# Patient Record
Sex: FEMALE | Race: WHITE
[De-identification: names, ages, dates, MRNs, and addresses within clinical notes are randomized per-mention and may not be internally consistent; named-entity substitution may affect disease eponyms.]

---

## 2018-01-12 ENCOUNTER — HOSPITAL ENCOUNTER (EMERGENCY)
Dept: HOSPITAL 74 - JER | Age: 63
Discharge: HOME | End: 2018-01-12
Payer: SELF-PAY

## 2018-01-12 VITALS — HEART RATE: 66 BPM | SYSTOLIC BLOOD PRESSURE: 149 MMHG | DIASTOLIC BLOOD PRESSURE: 67 MMHG

## 2018-01-12 VITALS — TEMPERATURE: 97.6 F

## 2018-01-12 VITALS — BODY MASS INDEX: 23.8 KG/M2

## 2018-01-12 DIAGNOSIS — K52.9: Primary | ICD-10-CM

## 2018-01-12 LAB
ALBUMIN SERPL-MCNC: 3.6 G/DL (ref 3.4–5)
ALP SERPL-CCNC: 67 U/L (ref 45–117)
ALT SERPL-CCNC: 21 U/L (ref 12–78)
ANION GAP SERPL CALC-SCNC: 6 MMOL/L (ref 8–16)
APPEARANCE UR: CLEAR
AST SERPL-CCNC: 34 U/L (ref 15–37)
BASOPHILS # BLD: 1.2 % (ref 0–2)
BILIRUB SERPL-MCNC: 0.4 MG/DL (ref 0.2–1)
BILIRUB UR STRIP.AUTO-MCNC: NEGATIVE MG/DL
BUN SERPL-MCNC: 11 MG/DL (ref 7–18)
CALCIUM SERPL-MCNC: 8.5 MG/DL (ref 8.5–10.1)
CHLORIDE SERPL-SCNC: 103 MMOL/L (ref 98–107)
CO2 SERPL-SCNC: 30 MMOL/L (ref 21–32)
COLOR UR: (no result)
CREAT SERPL-MCNC: 0.7 MG/DL (ref 0.55–1.02)
DEPRECATED RDW RBC AUTO: 13.5 % (ref 11.6–15.6)
EOSINOPHIL # BLD: 5.1 % (ref 0–4.5)
GLUCOSE SERPL-MCNC: 96 MG/DL (ref 74–106)
HCT VFR BLD CALC: 35.8 % (ref 32.4–45.2)
HGB BLD-MCNC: 12.3 GM/DL (ref 10.7–15.3)
KETONES UR QL STRIP: NEGATIVE
LEUKOCYTE ESTERASE UR QL STRIP.AUTO: (no result)
LYMPHOCYTES # BLD: 49.1 % (ref 8–40)
MCH RBC QN AUTO: 30.4 PG (ref 25.7–33.7)
MCHC RBC AUTO-ENTMCNC: 34.4 G/DL (ref 32–36)
MCV RBC: 88.5 FL (ref 80–96)
MONOCYTES # BLD AUTO: 5.7 % (ref 3.8–10.2)
NEUTROPHILS # BLD: 38.9 % (ref 42.8–82.8)
NITRITE UR QL STRIP: NEGATIVE
PH UR: 5 [PH] (ref 5–8)
PLATELET # BLD AUTO: 202 K/MM3 (ref 134–434)
PMV BLD: 7 FL (ref 7.5–11.1)
POTASSIUM SERPLBLD-SCNC: 3.5 MMOL/L (ref 3.5–5.1)
PROT SERPL-MCNC: 7.1 G/DL (ref 6.4–8.2)
PROT UR QL STRIP: NEGATIVE
PROT UR QL STRIP: NEGATIVE
RBC # BLD AUTO: 4.05 M/MM3 (ref 3.6–5.2)
RBC # UR STRIP: (no result) /UL
SODIUM SERPL-SCNC: 139 MMOL/L (ref 136–145)
SP GR UR: 1.01 (ref 1–1.03)
UROBILINOGEN UR STRIP-MCNC: NEGATIVE MG/DL (ref 0.2–1)
WBC # BLD AUTO: 4.2 K/MM3 (ref 4–10)

## 2018-01-12 PROCEDURE — 3E033NZ INTRODUCTION OF ANALGESICS, HYPNOTICS, SEDATIVES INTO PERIPHERAL VEIN, PERCUTANEOUS APPROACH: ICD-10-PCS | Performed by: EMERGENCY MEDICINE

## 2018-01-12 NOTE — PDOC
History of Present Illness





- General


History Source: Patient


Exam Limitations: No Limitations





- History of Present Illness


Initial Comments: 





01/12/18 22:18


Patient is a 62 year old female with a significant past medical history of 

hypothyroidism who presents to the ED with complaints of headache that began 

this afternoon.  Patient reports experiencing headache this afternoon suddenly 

while home showing no signs of subsiding.  She reports experiencing 

intermittent fever and chills that began today at 5 pm.  Patient reports not 

taken any medication for pain.  As per patient's family, patient experienced 4 

episodes of vomiting since this morning. 


Denies nausea, vomiting. Denies chest pain, SOB. Denies contact with sick 

individuals, out of state travels. Denies any other symptoms. 


Allergies: None


Social history: No smoking. No alcohol. No illicit drugs. 


Surgical history: None


PMD: None








<Ze Piña - Last Filed: 01/12/18 22:18>





<Margareth Anton - Last Filed: 01/12/18 22:38>





- General


Chief Complaint: Nausea/Vomiting


Stated Complaint: FEVER


Time Seen by Provider: 01/12/18 19:59





Past History





<Ze Piña - Last Filed: 01/12/18 22:18>





- Past Medical History


CVA: Yes (H/O LACUNAR INFARCT)





- Suicide/Smoking/Psychosocial Hx


Smoking Status: No


Smoking History: Never smoked


Number of Cigarettes Smoked Daily: 0


Hx Alcohol Use: No


Drug/Substance Use Hx: No





<Margareth Anton - Last Filed: 01/12/18 22:38>





- Past Medical History


Allergies/Adverse Reactions: 


 Allergies











Allergy/AdvReac Type Severity Reaction Status Date / Time


 


No Known Allergies Allergy   Verified 01/12/18 22:00











Home Medications: 


Ambulatory Orders





Levothyroxine [Synthroid -] 50 mcg PO DAILY@0700 #0 tablet 02/28/13 


Pantoprazole Sodium [Protonix -] 40 mg PO DAILY #0 tablet.ec 02/28/13 


Acetaminophen [Tylenol] 650 mg PO Q4H PRN 01/12/18 


Ibuprofen [Motrin -] 600 mg PO TID PRN 01/12/18 











**Review of Systems





- Review of Systems


Able to Perform ROS?: Yes


Comments:: 





01/12/18 22:18


ADULT ROS


GENERAL/CONSTITUTIONAL: +Headache. +Fever. +Chills. 


No weakness.


HEAD, EYES, EARS, NOSE AND THROAT: No change in vision. No ear pain or 

discharge. No sore throat.


CARDIOVASCULAR: No chest pain or shortness of breath.


RESPIRATORY: No cough, wheezing, or hemoptysis.


GASTROINTESTINAL: +Vomiting. 


No nausea, diarrhea or constipation.


GENITOURINARY: No dysuria, frequency, or change in urination.


MUSCULOSKELETAL: No joint or muscle swelling or pain. No neck or back pain.


SKIN: No rash


NEUROLOGIC: +Headache. 


No vertigo, loss of consciousness, or change in strength/sensation.


ENDOCRINE: No increased thirst. No abnormal weight change.


HEMATOLOGIC/LYMPHATIC: No anemia, easy bleeding, or history of blood clots.


ALLERGIC/IMMUNOLOGIC: No hives or skin allergy.





All Other Systems: Reviewed and Negative





<Ze Piña - Last Filed: 01/12/18 22:18>





*Physical Exam





- Vital Signs


 Last Vital Signs











Temp Pulse Resp BP Pulse Ox


 


 97.6 F   67   18   130/60   99 


 


 01/12/18 19:57  01/12/18 19:57  01/12/18 19:57  01/12/18 19:57  01/12/18 19:57














- Physical Exam


Comments: 





01/12/18 22:19


GENERAL: Awake, alert, and fully oriented, in no acute distress


HEAD: No signs of trauma


EYES: +injected eyes. 


PERRLA, EOMI, sclera anicteric,


ENT: Auricles normal inspection, hearing grossly normal, nares patent, 

oropharynx clear without exudates. Moist mucosa


NECK: Normal ROM, supple, no lymphadenopathy, JVD, or masses


LUNGS: +Wheezing at top of right side. 


Breath sounds equal, clear to auscultation bilaterally.  No wheezes, and no 

crackles


HEART: Regular rate and rhythm, normal S1 and S2, no murmurs, rubs or gallops


ABDOMEN: +Superpubic pain. +Right flank pain. 


Soft, nontender, normoactive bowel sounds.  No guarding, no rebound.  No masses


EXTREMITIES: Normal range of motion, no edema.  No clubbing or cyanosis. No 

cords, erythema, or tenderness


NEUROLOGICAL: Cranial nerves II through XII grossly intact.  Normal speech, 

normal gait


SKIN: Warm, Dry, normal turgor, no rashes or lesions noted.








<Ze Piña - Last Filed: 01/12/18 22:18>





- Vital Signs


 Last Vital Signs











Temp Pulse Resp BP Pulse Ox


 


 97.6 F   67   18   130/60   99 


 


 01/12/18 19:57  01/12/18 19:57  01/12/18 19:57  01/12/18 19:57  01/12/18 19:57














<Margareth Anton - Last Filed: 01/12/18 22:38>





ED Treatment Course





- LABORATORY


CBC & Chemistry Diagram: 


 01/12/18 20:44





 01/12/18 20:44





- ADDITIONAL ORDERS


Additional order review: 


 Laboratory  Results











  01/12/18





  20:44


 


Sodium  139


 


Potassium  3.5


 


Chloride  103


 


Carbon Dioxide  30


 


Anion Gap  6 L


 


BUN  11


 


Creatinine  0.7


 


Creat Clearance w eGFR  > 60


 


Random Glucose  96


 


Calcium  8.5


 


Total Bilirubin  0.4  D


 


AST  34  D


 


ALT  21  D


 


Alkaline Phosphatase  67


 


Total Protein  7.1


 


Albumin  3.6


 


TSH  1.43








 











  01/12/18





  20:44


 


RBC  4.05


 


MCV  88.5


 


MCHC  34.4


 


RDW  13.5


 


MPV  7.0 L


 


Neutrophils %  38.9 L D


 


Lymphocytes %  49.1 H D


 


Monocytes %  5.7


 


Eosinophils %  5.1 H D


 


Basophils %  1.2














- Medications


Given in the ED: 


ED Medications














Discontinued Medications














Generic Name Dose Route Start Last Admin





  Trade Name Adán  PRN Reason Stop Dose Admin


 


Acetaminophen  1,000 mg  01/12/18 20:38  01/12/18 21:23





  Tylenol -  PO  01/12/18 20:39  Not Given





  ONCE ONE   


 


Acetaminophen  1,000 mg  01/12/18 21:05  01/12/18 21:07





  Ofirmev Injection -  IVPB  01/12/18 21:06  1,000 mg





  ONCE ONE   Administration


 


Sodium Chloride  1,000 ml  01/12/18 20:38  01/12/18 21:03





  Normal Saline -  IV  01/12/18 20:39  1,000 ml





  ONCE ONE   Administration














<Ze Piña - Last Filed: 01/12/18 22:18>





- LABORATORY


CBC & Chemistry Diagram: 


 01/12/18 20:44





 01/12/18 20:44





- RADIOLOGY


Radiology Studies Ordered: 














 Category Date Time Status


 


 CHEST PA & LAT [RAD] Stat Radiology  01/12/18 20:39 Ordered














<Margareth Anton - Last Filed: 01/12/18 22:38>





Medical Decision Making





- Medical Decision Making





01/12/18 22:24


Pt comes with HA and nausea and vomiting x 4. No fever,  Pt still has HA; she 

has no neuro findings.  She has taken nothing for the pain at home.  Here she 

was hydrated and given tylenol and she is feeling better.


01/12/18 22:36


Ashvin is feeling better and she is ready for discharge.  Her headache has 

completely resolved with tylenol.  She states that her vomiting may have been 

due to something she ate in the home.  She states taht all family members eat 

their own food and that it is possible that she was food poisoned from a left 

over that she ate.  Pt has no fever and no chills and all her labs are normal. 


01/12/18 22:37


EKG same pattern as old





<Margareth Anton - Last Filed: 01/12/18 22:38>





*DC/Admit/Observation/Transfer





- Attestations


Scribe Attestion: 





01/12/18 22:19





Documentation prepared by Ze Piña, acting as medical scribe for Margareth Anton MD/DO.





<Ze Piña - Last Filed: 01/12/18 22:18>





- Discharge Dispostion


Admit: No





<Margareth Anton - Last Filed: 01/12/18 22:38>


Diagnosis at time of Disposition: 


 Nausea and vomiting, Headache, Gastroenteritis








- Discharge Dispostion


Disposition: HOME


Condition at time of disposition: Stable





- Patient Instructions


Printed Discharge Instructions:  DI for Vomiting -- Adult, DI for Headache

## 2018-01-12 NOTE — PDOC
Rapid Medical Evaluation


Chief Complaint: Nausea/Vomiting


Time Seen by Provider: 01/12/18 19:59


Medical Evaluation: 


 Allergies











Allergy/AdvReac Type Severity Reaction Status Date / Time


 


No Known Allergies Allergy   Verified 03/07/16 18:10











01/12/18 19:59





61yo Female patient presented to ED by family c/o headache, vomiting, chills 

that began today. Family report no other complaints at this time.

## 2018-01-13 LAB
EPITH CASTS URNS QL MICRO: (no result) /HPF
MUCOUS THREADS URNS QL MICRO: (no result)

## 2018-01-13 NOTE — EKG
Test Reason : 

Blood Pressure : ***/*** mmHG

Vent. Rate : 064 BPM     Atrial Rate : 064 BPM

   P-R Int : 198 ms          QRS Dur : 100 ms

    QT Int : 494 ms       P-R-T Axes : 064 074 007 degrees

   QTc Int : 509 ms

 

NORMAL SINUS RHYTHM

INCOMPLETE RIGHT BUNDLE BRANCH BLOCK

PROLONGED QT

NONSPECIFIC T WAVE ABNORMALITY

ABNORMAL ECG

WHEN COMPARED WITH ECG OF 23-FEB-2013 11:46,

NO SIGNIFICANT CHANGE WAS FOUND

Confirmed by ELIAS MOHR MD ([id number]) on 1/13/2018 4:48:24 PM

 

Referred By:             Confirmed By:ELIAS MOHR MD

## 2018-05-29 ENCOUNTER — HOSPITAL ENCOUNTER (INPATIENT)
Dept: HOSPITAL 74 - JER | Age: 63
LOS: 4 days | Discharge: HOME | DRG: 720 | End: 2018-06-02
Attending: INTERNAL MEDICINE | Admitting: INTERNAL MEDICINE
Payer: COMMERCIAL

## 2018-05-29 VITALS — BODY MASS INDEX: 23.1 KG/M2

## 2018-05-29 DIAGNOSIS — R18.8: ICD-10-CM

## 2018-05-29 DIAGNOSIS — E03.9: ICD-10-CM

## 2018-05-29 DIAGNOSIS — E87.2: ICD-10-CM

## 2018-05-29 DIAGNOSIS — J98.11: ICD-10-CM

## 2018-05-29 DIAGNOSIS — T78.3XXA: ICD-10-CM

## 2018-05-29 DIAGNOSIS — G93.40: ICD-10-CM

## 2018-05-29 DIAGNOSIS — E86.0: ICD-10-CM

## 2018-05-29 DIAGNOSIS — E87.0: ICD-10-CM

## 2018-05-29 DIAGNOSIS — N17.9: ICD-10-CM

## 2018-05-29 DIAGNOSIS — R65.21: ICD-10-CM

## 2018-05-29 DIAGNOSIS — A41.9: Primary | ICD-10-CM

## 2018-05-29 DIAGNOSIS — R41.82: ICD-10-CM

## 2018-05-29 DIAGNOSIS — A87.9: ICD-10-CM

## 2018-05-29 DIAGNOSIS — J90: ICD-10-CM

## 2018-05-29 DIAGNOSIS — N32.89: ICD-10-CM

## 2018-05-29 LAB
ALBUMIN SERPL-MCNC: 3.9 G/DL (ref 3.4–5)
ALP SERPL-CCNC: 86 U/L (ref 45–117)
ALT SERPL-CCNC: 21 U/L (ref 12–78)
ANION GAP SERPL CALC-SCNC: 10 MMOL/L (ref 8–16)
APPEARANCE UR: (no result)
APTT BLD: 48.3 SECONDS (ref 26.9–34.4)
AST SERPL-CCNC: 53 U/L (ref 15–37)
BACTERIA #/AREA URNS HPF: (no result) /HPF
BASOPHILS # BLD: 1 % (ref 0–2)
BILIRUB SERPL-MCNC: 0.8 MG/DL (ref 0.2–1)
BILIRUB UR STRIP.AUTO-MCNC: NEGATIVE MG/DL
BUN SERPL-MCNC: 13 MG/DL (ref 7–18)
CALCIUM SERPL-MCNC: 8.6 MG/DL (ref 8.5–10.1)
CHLORIDE SERPL-SCNC: 100 MMOL/L (ref 98–107)
CO2 SERPL-SCNC: 24 MMOL/L (ref 21–32)
COLOR UR: YELLOW
CREAT SERPL-MCNC: 1.3 MG/DL (ref 0.55–1.02)
DEPRECATED RDW RBC AUTO: 13.6 % (ref 11.6–15.6)
EOSINOPHIL # BLD: 0.8 % (ref 0–4.5)
GLUCOSE SERPL-MCNC: 120 MG/DL (ref 74–106)
HCT VFR BLD CALC: 42.4 % (ref 32.4–45.2)
HGB BLD-MCNC: 14.3 GM/DL (ref 10.7–15.3)
INR BLD: 1.23 (ref 0.82–1.09)
KETONES UR QL STRIP: NEGATIVE
LEUKOCYTE ESTERASE UR QL STRIP.AUTO: NEGATIVE
LYMPHOCYTES # BLD: 28.5 % (ref 8–40)
MCH RBC QN AUTO: 30.1 PG (ref 25.7–33.7)
MCHC RBC AUTO-ENTMCNC: 33.7 G/DL (ref 32–36)
MCV RBC: 89.2 FL (ref 80–96)
MONOCYTES # BLD AUTO: 6.8 % (ref 3.8–10.2)
NEUTROPHILS # BLD: 62.9 % (ref 42.8–82.8)
NITRITE UR QL STRIP: NEGATIVE
PH BLDV: 7.35 [PH] (ref 7.32–7.42)
PH UR: 5 [PH] (ref 5–8)
PLATELET # BLD AUTO: 266 K/MM3 (ref 134–434)
PMV BLD: 7.5 FL (ref 7.5–11.1)
POTASSIUM SERPLBLD-SCNC: 3.9 MMOL/L (ref 3.5–5.1)
PROT SERPL-MCNC: 8 G/DL (ref 6.4–8.2)
PROT UR QL STRIP: (no result)
PROT UR QL STRIP: NEGATIVE
PT PNL PPP: 13.9 SEC (ref 9.7–13)
RBC # BLD AUTO: 4.75 M/MM3 (ref 3.6–5.2)
RBC # UR STRIP: (no result) /UL
SODIUM SERPL-SCNC: 134 MMOL/L (ref 136–145)
SP GR UR: 1.01 (ref 1–1.03)
UROBILINOGEN UR STRIP-MCNC: NEGATIVE MG/DL (ref 0.2–1)
VENOUS PC02: 44.2 MMHG (ref 38–52)
VENOUS PO2: 34.9 MMHG (ref 28–48)
WBC # BLD AUTO: 8 K/MM3 (ref 4–10)

## 2018-05-29 RX ADMIN — CHLORHEXIDINE GLUCONATE SCH APPLIC: 213 SOLUTION TOPICAL at 22:49

## 2018-05-29 RX ADMIN — AMPICILLIN SODIUM SCH MLS/HR: 2 INJECTION, POWDER, FOR SOLUTION INTRAMUSCULAR; INTRAVENOUS at 22:48

## 2018-05-29 RX ADMIN — DEXTROSE MONOHYDRATE SCH MLS/HR: 50 INJECTION, SOLUTION INTRAVENOUS at 18:55

## 2018-05-29 RX ADMIN — MUPIROCIN SCH APPLIC: 20 OINTMENT TOPICAL at 22:49

## 2018-05-29 RX ADMIN — SODIUM CHLORIDE SCH MLS/HR: 9 INJECTION, SOLUTION INTRAVENOUS at 22:48

## 2018-05-29 RX ADMIN — ACETAMINOPHEN PRN MG: 325 TABLET ORAL at 21:56

## 2018-05-29 NOTE — CONSULT
Consultation: 


REQUESTING PROVIDER:Jyoti Urena ED res 





CONSULT REQUEST: We have been asked to medically evaluate this patient for 

septic shock.





HISTORY OF PRESENT ILLNESS:


62 year old  Syriac speaking female  with pmhx of hypothyroidism and lacunar 

stroke presented to the ED after her son found her unresponsive around 3 pm 

today and warm to touch. pt was last seen in her base line status last night 

around 11 pm when she was cooking. history was taking from son . son denies any 

recent cold, cough , sore throat or sick contact. no N/V/D/C/ chest pain , 

abdominal pain or urinary symptoms was reported. son denies any head trauma or 

fall. pt is not taking any medication and was not seen by doctor for the last 7 

years. she lives with her son. 








PMHx: Hypothyroidism , lacunar stroke 


PSHx:  long time ago 


FHx: non contributory(appendicities in her mom )


Shx: denies smoking, alcohol or illicit drug use 


Allergies: NKDA 


 








REVIEW OF SYSTEMS:


CONSTITUTIONAL: 


Absent:  fever, chills, diaphoresis, generalized weakness, malaise, loss of 

appetite, weight change


HEENT: 


Absent:  rhinorrhea, nasal congestion, throat pain, throat swelling, difficulty 

swallowing, mouth swelling, ear pain, eye pain, visual changes


CARDIOVASCULAR: 


Absent: chest pain, syncope, palpitations, irregular heart rate, lightheadedness

, peripheral edema


RESPIRATORY: 


Absent: cough, shortness of breath, dyspnea with exertion, orthopnea, wheezing, 

stridor, hemoptysis


GASTROINTESTINAL:


Absent: abdominal pain, abdominal distension, nausea, vomiting, diarrhea, 

constipation, melena, hematochezia


GENITOURINARY: 


Absent: dysuria, frequency, urgency, hesitancy, hematuria, flank pain, genital 

pain


MUSCULOSKELETAL: 


Absent: myalgia, arthralgia, joint swelling, back pain, neck pain


SKIN: 


Absent: rash, itching, pallor


HEMATOLOGIC/IMMUNOLOGIC: 


Absent: easy bleeding, easy bruising, lymphadenopathy, frequent infections


ENDOCRINE:


Absent: unexplained weight gain, unexplained weight loss, heat intolerance, 

cold intolerance


NEUROLOGIC: 


Absent: headache, focal weakness or paresthesias, dizziness, unsteady gait, 

seizure, mental status changes, bladder or bowel incontinence


PSYCHIATRIC: 


Absent: anxiety, depression, suicidal or homicidal ideation, hallucinations.





PHYSICAL EXAMINATION





 Vital Signs - 24 hr











  18





  16:25 16:30 16:45


 


Temperature  104.5 F H 


 


Pulse Rate 119 H  


 


Pulse Rate [   107 H





Apical]   


 


Respiratory 16  22





Rate   


 


Blood Pressure 70/40  


 


Blood Pressure   105/44





[Left Arm]   


 


O2 Sat by Pulse 93 L  100





Oximetry (%)   














  18





  16:59 17:37 18:06


 


Temperature 104.5 F H  


 


Pulse Rate  91 H 


 


Pulse Rate [   100 H





Apical]   


 


Respiratory   





Rate   


 


Blood Pressure  61/30 


 


Blood Pressure   66/27





[Left Arm]   


 


O2 Sat by Pulse 100  





Oximetry (%)   














  18





  18:07 18:17 18:39


 


Temperature   


 


Pulse Rate 100 H 98 H 


 


Pulse Rate [   88





Apical]   


 


Respiratory   24





Rate   


 


Blood Pressure 66/27 79/31 


 


Blood Pressure   91/33





[Left Arm]   


 


O2 Sat by Pulse   100





Oximetry (%)   














  18





  18:55 18:57 19:05


 


Temperature  99.4 F 


 


Pulse Rate 88  88


 


Pulse Rate [  88 





Apical]   


 


Respiratory  22 





Rate   


 


Blood Pressure 91/43  91/43


 


Blood Pressure  91/43 





[Left Arm]   


 


O2 Sat by Pulse  100 





Oximetry (%)   














  18





  20:00


 


Temperature 98.8 F


 


Pulse Rate 


 


Pulse Rate [ 95 H





Apical] 


 


Respiratory 16





Rate 


 


Blood Pressure 


 


Blood Pressure 80/34





[Left Arm] 


 


O2 Sat by Pulse 97





Oximetry (%) 














GENERAL: AAOx1 , in NAD , sleepy with generalized fatigue , follow commands 


HEAD:NC/AT 


EYES: TERRA, EOMI , sclera anicteric, conjunctiva clear. 


EARS, NOSE, THROAT: Ears normal, nares patent, oropharynx clear without 

exudates. dry MM 


NECK: Normal range of motion, supple without lymphadenopathy, JVD, 


LUNGS: Breath sounds equal, clear to auscultation bilaterally. No wheezes, and 

no crackles. No accessory muscle use.


HEART: Regular rate and rhythm, normal S1 and S2 without murmur, rub or gallop.


ABDOMEN: Soft, nontender, not distended, normoactive bowel sounds, no guarding, 

no rebound, no masses.  


MUSCULOSKELETAL: No CVA tenderness.


UPPER EXTREMITIES: 2+ pulses, warm, well-perfused. No cyanosis. No clubbing. 

Cap refill <2 seconds. No peripheral edema.


LOWER EXTREMITIES: 2+ pulses, warm, well-perfused. No calf tenderness. No 

peripheral edema. 


NEUROLOGICAL:  Cranial nerves II-XII intact. Normal speech. +5/5 strenth upper 

and lower ext proximal and distal 


PSYCHIATRIC: Cooperative. lethargic 


SKIN: Warm, dry, normal turgor,











 Laboratory Results - last 24 hr











  18





  16:40 16:40 16:40


 


WBC  8.0  D  


 


RBC  4.75  


 


Hgb  14.3  D  


 


Hct  42.4  D  


 


MCV  89.2  


 


MCH  30.1  


 


MCHC  33.7  


 


RDW  13.6  


 


Plt Count  266  D  


 


MPV  7.5  


 


Neutrophils %  62.9  D  


 


Lymphocytes %  28.5  D  


 


Monocytes %  6.8  


 


Eosinophils %  0.8  D  


 


Basophils %  1.0  


 


Nucleated RBC %  0  


 


PT with INR   13.90 H 


 


INR   1.23 H 


 


PTT (Actin FS)   48.3 H 


 


VBG pH    7.35


 


POC VBG pCO2    44.2


 


POC VBG pO2    34.9


 


Mixed VBG HCO3    24.0


 


Sodium   


 


Potassium   


 


Chloride   


 


Carbon Dioxide   


 


Anion Gap   


 


BUN   


 


Creatinine   


 


Creat Clearance w eGFR   


 


Random Glucose   


 


Lactic Acid   


 


Calcium   


 


Total Bilirubin   


 


AST   


 


ALT   


 


Alkaline Phosphatase   


 


Troponin I   


 


Total Protein   


 


Albumin   


 


TSH   


 


Urine Color   


 


Urine Appearance   


 


Urine pH   


 


Ur Specific Gravity   


 


Urine Protein   


 


Urine Glucose (UA)   


 


Urine Ketones   


 


Urine Blood   


 


Urine Nitrite   


 


Urine Bilirubin   


 


Urine Urobilinogen   


 


Ur Leukocyte Esterase   


 


Urine WBC (Auto)   


 


Urine RBC (Auto)   


 


Urine Bacteria   














  18





  16:40 16:40 16:40


 


WBC   


 


RBC   


 


Hgb   


 


Hct   


 


MCV   


 


MCH   


 


MCHC   


 


RDW   


 


Plt Count   


 


MPV   


 


Neutrophils %   


 


Lymphocytes %   


 


Monocytes %   


 


Eosinophils %   


 


Basophils %   


 


Nucleated RBC %   


 


PT with INR   


 


INR   


 


PTT (Actin FS)   


 


VBG pH   


 


POC VBG pCO2   


 


POC VBG pO2   


 


Mixed VBG HCO3   


 


Sodium  134 L  


 


Potassium  3.9  


 


Chloride  100  


 


Carbon Dioxide  24  


 


Anion Gap  10  


 


BUN  13  


 


Creatinine  1.3 H  


 


Creat Clearance w eGFR  41.24  


 


Random Glucose  120 H  


 


Lactic Acid   2.4 H* 


 


Calcium  8.6  


 


Total Bilirubin  0.8  D  


 


AST  53 H  


 


ALT  21  


 


Alkaline Phosphatase  86  


 


Troponin I    0.17 H


 


Total Protein  8.0  


 


Albumin  3.9  


 


TSH   


 


Urine Color   


 


Urine Appearance   


 


Urine pH   


 


Ur Specific Gravity   


 


Urine Protein   


 


Urine Glucose (UA)   


 


Urine Ketones   


 


Urine Blood   


 


Urine Nitrite   


 


Urine Bilirubin   


 


Urine Urobilinogen   


 


Ur Leukocyte Esterase   


 


Urine WBC (Auto)   


 


Urine RBC (Auto)   


 


Urine Bacteria   














  1818





  16:48 16:59 18:30


 


WBC   


 


RBC   


 


Hgb   


 


Hct   


 


MCV   


 


MCH   


 


MCHC   


 


RDW   


 


Plt Count   


 


MPV   


 


Neutrophils %   


 


Lymphocytes %   


 


Monocytes %   


 


Eosinophils %   


 


Basophils %   


 


Nucleated RBC %   


 


PT with INR   


 


INR   


 


PTT (Actin FS)   


 


VBG pH   


 


POC VBG pCO2   


 


POC VBG pO2   


 


Mixed VBG HCO3   


 


Sodium   


 


Potassium   


 


Chloride   


 


Carbon Dioxide   


 


Anion Gap   


 


BUN   


 


Creatinine   


 


Creat Clearance w eGFR   


 


Random Glucose   


 


Lactic Acid    1.6


 


Calcium   


 


Total Bilirubin   


 


AST   


 


ALT   


 


Alkaline Phosphatase   


 


Troponin I   


 


Total Protein   


 


Albumin   


 


TSH   1.48 


 


Urine Color  Yellow  


 


Urine Appearance  Slcloudy  


 


Urine pH  5.0  


 


Ur Specific Gravity  1.012  


 


Urine Protein  2+ H  


 


Urine Glucose (UA)  Negative  


 


Urine Ketones  Negative  


 


Urine Blood  3+ H  


 


Urine Nitrite  Negative  


 


Urine Bilirubin  Negative  


 


Urine Urobilinogen  Negative  


 


Ur Leukocyte Esterase  Negative  


 


Urine WBC (Auto)  1  


 


Urine RBC (Auto)  44  


 


Urine Bacteria  Rare  








Active Medications











Generic Name Dose Route Start Last Admin





  Trade Name Freq  PRN Reason Stop Dose Admin


 


Acetaminophen  650 mg  18 20:15  





  Tylenol -  PO   





  Q4H PRN   





  FEVER   





     





     





     


 


Chlorhexidine Gluconate  1 applic  18 22:00  





  Hibiclens For Decolonization -  TP   





  HS MARILEE   





     





     





     





     


 


Heparin Sodium (Porcine)  5,000 unit  18 02:00  





  Heparin -  SQ   





  Q8H-IV MARILEE   





     





     





     





     


 


Dopamine HCl/Dextrose  400,000 mcg in 250 mls @ 8.165 mls/hr  18 17:30   19:05





  Dopamine 400 Mg/D5w -  IVPB   0 mcg/kg/min





  TITR MARILEE   0 mls/hr





     Titration





     





  Protocol   





  5 MCG/KG/MIN   


 


Norepinephrine Bitartrate 4,  500 mls @ 37.5 mls/hr  18 19:15  18 20

:15





  000 mcg/ Dextrose  IV   10 mcg/min





  TITR MARILEE   75 mls/hr





     Titration





     





  Protocol   





  5 MCG/MIN   


 


Sodium Chloride  1,000 mls @ 125 mls/hr  18 20:00  





  Normal Saline -  IV   





  ASDIR MARILEE   





     





     





     





     


 


Ceftriaxone Sodium  2 gm in 50 mls @ 100 mls/hr  18 20:15  





  Ceftriaxone 2 Gm-D5w Bag  IVPB   





  Q12H MARILEE   





     





     





  Protocol   





     


 


Vancomycin HCl 1,000 mg/  250 mls @ 166.667 mls/hr  18 20:15  





  Dextrose  IVPB   





  Q12H MARILEE   





     





     





  Protocol   





     


 


Ampicillin Sodium 2 gm/ Sodium  100 mls @ 200 mls/hr  18 21:00  





  Chloride  IVPB   





  Q6H-IV MARILEE   





     





     





  Protocol   





     


 


Vancomycin HCl 1,000 mg/  250 mls @ 166.667 mls/hr  18 05:00  





  Dextrose  IVPB  18 06:29  





  ONCE ONE   





     





     





     





     


 


Levothyroxine Sodium  50 mcg  18 07:00  





  Synthroid -  PO   





  DAILY@0700 [facility]   





     





     





     





     


 


Mupirocin  1 applic  18 22:00  





  Bactroban Ointment (For Decolonization) -  NS  18 21:59  





  BID [facility]   





     





     





     





     


 


Pantoprazole Sodium  40 mg  18 10:00  





  Protonix -  PO   





  DAILY MARILEE   





     





     





     





     





 CBC, BMP





 18 16:40 





 18 16:40 











ASSESSMENT/PLAN:


62 year old female with pmhx of hypothyroidism and lacunar stroke presented to 

ED by her son who found her unresponsive,in Ed pt  found to have septic shock 

and admitted to ICU for further evaluation. 





# Septic shock unknown etilogy , 


* pt was hypotensive on admission with LA 2.4 and fever 104 


* IV fluids in ED bolus and maintenance 


* pan cx blood urine 


* cxr, CT abdomen pelvic and Ct head without contrast 


* Levophed at 10mcgs, titrate to MAP > 65 --> improved to 123/94 () and 

pressures were held due to improve BP and stable .....pt BP has droped again 

and will resume fluids @ 125 CC and pressers 


* Vancomycin 1gm Q12, Rocephin 2gm Q12, Ampicillin 2gms Q6h


* LP to R/O meningitis (no nuchal rigidity on physical exam , pt mental status 

has improved with IV fluids )


* admit to ICU 


* LA 2.4 ....1.6 after fluids 


# AMS likely 2/2 dehydration vs low oral intake ,vs heat stroke , vs stroke 


* was unresponsive on admission improve with IV fluids 


* continue NS @ 125 CC/hr 


* repeat BMP in AM 


* f/u head ct w/o contrast 


* urine toxicology screen 


* cortisol AM 


# troponinemia likely demand ischemia 


* trop 0.17 on admission likely due to ANGIE , trend 


* EKG no St, T wave changes 


* pt denies any chest pain or discomfort 


* echo 


# ANGIE 


* likely pre renal azotemia due to volume depletion , CR 1.3 


* continue IV fluids 


* Monitor I&O 


* repeat BUN /Cr in AM 


* 


# H/O Hypothyroisdm 


* pt is not taking any meds , no PCP for last 7 years 


* denies nay heat or cold intolerance 


* TSH and Free T4 


* continue previous home dose


* 


# FEN


* F: Continue on 125 CC/ hr NS (NS bolus was given in ED )


* E:  mild hyponatremia 134 repeat in AM 


* N: NPO for now 








# Proph 


* DVTS: SCDS both legs , hep 5000 DQ TID 


* GI: no need for now 








# Dispo


* Admit to ICU 


* total critical time 45 min 


Dispo: We will continue to follow the patient. Thank you for this consultative 

opportunity.











Visit type





- Emergency Visit


Emergency Visit: Yes


ED Registration Date: 18


Care time: The patient presented to the Emergency Department on the above date 

and was hospitalized for further evaluation of their emergent condition.





- New Patient


This patient is new to me today: Yes


Date on this admission: 18





- Critical Care


Critical Care patient: Yes


Total Critical Care Time (in minutes): 45


Critical Care Statement: The care of this patient involved high complexity 

decision making to prevent further life threatening deterioration of the patient

's condition and/or to evaluate & treat vital organ system(s) failure or risk 

of failure.

## 2018-05-29 NOTE — PDOC
Attending Attestation





- Resident


Resident Name: Gamal Reyes





- HPI


HPI: 





05/29/18 17:53


Pt presents to the ED after found febrile and minimally responsive by her son.  

As far as he can tell, she was in her usual state of health yesterday night.  

He did not see her at all today until shortly before admision.  Patient has a 

history of thyroid problems--her son is unsure whether she has hyper or 

hypothyroidism. 


05/29/18 17:54








- Physicial Exam


PE: 





05/29/18 17:58


Agree with resident exam.  Patient is hypotensive, mildly hypoxic and 

lethargic.  She will open her eyes and state her name in response to sternal 

rub.  Abdomen is non distended and lungs are clear. 





- Critical Care Time


Total Critical Care Time: 45


Critical Care Statement: The care of this patient involved high complexity 

decision making to prevent further life threatening deterioration of the patient

's condition and/or to evaluate & treat vital organ system(s) failure or risk 

of failure.





- Medical Decision Making





05/29/18 17:59


Pt presents to the ED in septic shock.  Large bore IV access and agressive 

hydration inititated immediately after arrival.  FEbrile to 104.5--treated with 

IV tylenol and broad spectrum antibiotics.  Shortly after

## 2018-05-29 NOTE — HP
CHIEF COMPLAINT: fever, unresponsive





HISTORY OF PRESENT ILLNESS:





62 year old  female with a hx of hypothyroidism brought to ED by son 

after he found her unresponsive and febrile in their room. Patient's son 

provides the history. States that his mother lives with him, and earlier today 

he found her in her room not responding to commands and that she felt very hot 

to touch. He reports that prior to this, his mother was at baseline walking, 

talking, eating without any complaints. He states that this has happened to her 

once before around 7-8 years ago when she was hospitalized for low blood 

pressure, fever, and was told that she had a "high sodium level". During that 

admission, he reports that his mother had a lumbar puncture but is not sure 

what the results were. States that she looked similar during that admission to 

how she looks today. He reports that she was not sick recently or been around 

sick contacts. She has not traveled anywhere and is not currently employed. 

Denies chest pain, shortness of breath, nausea, vomiting, diarrhea, or dysuria. 

Upon questioning, the patient herself responded to some commands and stated 

that she is tired and has pain all over her body.





ER course was notable for:


(1) BP 70/40


(2) T 104.5


(3) 


(4) LA 2.4 --> 1.6


(5) Trop 0.17





Recent Travel: none





PAST MEDICAL HISTORY: hypothyroidism





PAST SURGICAL HISTORY: C section many years ago





Social History:


Smoking: never


Alcohol: never


Drugs: never





Allergies





No Known Allergies Allergy (Verified 05/29/18 17:24)


 








HOME MEDICATIONS:


 Home Medications











 Medication  Instructions  Recorded


 


Levothyroxine [Synthroid -] 50 mcg PO DAILY@0700 #0 tablet 02/28/13


 


Pantoprazole Sodium [Protonix -] 40 mg PO DAILY #0 tablet.ec 02/28/13


 


Acetaminophen [Tylenol] 650 mg PO Q4H PRN 01/12/18


 


Ibuprofen [Motrin -] 600 mg PO TID PRN 01/12/18








REVIEW OF SYSTEMS


CONSTITUTIONAL: fever, chills, diaphoresis, generalized weakness, malaise


Absent:   loss of appetite, weight change


HEENT: 


Absent:  rhinorrhea, nasal congestion, throat pain, throat swelling, difficulty 

swallowing, mouth swelling, ear pain, eye pain, visual changes


CARDIOVASCULAR: 


Absent: chest pain, syncope, palpitations, irregular heart rate, lightheadedness

, peripheral edema


RESPIRATORY: 


Absent: cough, shortness of breath, dyspnea with exertion, orthopnea, wheezing, 

stridor, hemoptysis


GASTROINTESTINAL:


Absent: abdominal pain, abdominal distension, nausea, vomiting, diarrhea, 

constipation, melena, hematochezia


GENITOURINARY: 


Absent: dysuria, frequency, urgency, hesitancy, hematuria, flank pain, genital 

pain


MUSCULOSKELETAL: 


Absent: myalgia, arthralgia, joint swelling, back pain, neck pain


SKIN: 


Absent: rash, itching, pallor


HEMATOLOGIC/IMMUNOLOGIC: 


Absent: easy bleeding, easy bruising, lymphadenopathy, frequent infections


ENDOCRINE:


Absent: unexplained weight gain, unexplained weight loss, heat intolerance, 

cold intolerance


NEUROLOGIC:  headache


Absent:focal weakness or paresthesias, dizziness, unsteady gait, seizure, 

mental status changes, bladder or bowel incontinence


PSYCHIATRIC: 


Absent: anxiety, depression, suicidal or homicidal ideation, hallucinations.








PHYSICAL EXAMINATION


 Vital Signs - 24 hr











  05/29/18 05/29/18 05/29/18





  16:25 16:30 16:45


 


Temperature  104.5 F H 


 


Pulse Rate 119 H  


 


Pulse Rate [   107 H





Apical]   


 


Respiratory 16  22





Rate   


 


Blood Pressure 70/40  


 


Blood Pressure   105/44





[Left Arm]   


 


O2 Sat by Pulse 93 L  100





Oximetry (%)   














  05/29/18 05/29/18 05/29/18





  16:59 17:37 18:06


 


Temperature 104.5 F H  


 


Pulse Rate  91 H 


 


Pulse Rate [   100 H





Apical]   


 


Respiratory   





Rate   


 


Blood Pressure  61/30 


 


Blood Pressure   66/27





[Left Arm]   


 


O2 Sat by Pulse 100  





Oximetry (%)   














  05/29/18 05/29/18 05/29/18





  18:07 18:17 18:39


 


Temperature   


 


Pulse Rate 100 H 98 H 


 


Pulse Rate [   88





Apical]   


 


Respiratory   24





Rate   


 


Blood Pressure 66/27 79/31 


 


Blood Pressure   91/33





[Left Arm]   


 


O2 Sat by Pulse   100





Oximetry (%)   














  05/29/18 05/29/18 05/29/18





  18:55 18:57 19:05


 


Temperature  99.4 F 


 


Pulse Rate 88  88


 


Pulse Rate [  88 





Apical]   


 


Respiratory  22 





Rate   


 


Blood Pressure 91/43  91/43


 


Blood Pressure  91/43 





[Left Arm]   


 


O2 Sat by Pulse  100 





Oximetry (%)   











GENERAL: A&Ox1, patient obtunded but is responding to simple commands


EYES: PERRLA, EOMI


ENT: Moist mucus membranes


NECK: No JVD


LUNGS: CTA, no wheezes


HEART: tachycardic, no murmurs


ABDOMEN: Soft, BS present, + suprapubic tenderness


MUSCULOSKELETAL: No CVA Tenderness


EXTREMITIES: 2+ pulses, no edema. 


NEUROLOGICAL:  Patient has 5/5 motor strength





 Laboratory Results - last 24 hr











  05/29/18 05/29/18 05/29/18





  16:40 16:40 16:40


 


WBC  8.0  D  


 


RBC  4.75  


 


Hgb  14.3  D  


 


Hct  42.4  D  


 


MCV  89.2  


 


MCH  30.1  


 


MCHC  33.7  


 


RDW  13.6  


 


Plt Count  266  D  


 


MPV  7.5  


 


Neutrophils %  62.9  D  


 


Lymphocytes %  28.5  D  


 


Monocytes %  6.8  


 


Eosinophils %  0.8  D  


 


Basophils %  1.0  


 


Nucleated RBC %  0  


 


PT with INR   13.90 H 


 


INR   1.23 H 


 


PTT (Actin FS)   48.3 H 


 


VBG pH    7.35


 


POC VBG pCO2    44.2


 


POC VBG pO2    34.9


 


Mixed VBG HCO3    24.0


 


Sodium   


 


Potassium   


 


Chloride   


 


Carbon Dioxide   


 


Anion Gap   


 


BUN   


 


Creatinine   


 


Creat Clearance w eGFR   


 


Random Glucose   


 


Lactic Acid   


 


Calcium   


 


Total Bilirubin   


 


AST   


 


ALT   


 


Alkaline Phosphatase   


 


Troponin I   


 


Total Protein   


 


Albumin   


 


TSH   


 


Urine Color   


 


Urine Appearance   


 


Urine pH   


 


Ur Specific Gravity   


 


Urine Protein   


 


Urine Glucose (UA)   


 


Urine Ketones   


 


Urine Blood   


 


Urine Nitrite   


 


Urine Bilirubin   


 


Urine Urobilinogen   


 


Ur Leukocyte Esterase   


 


Urine WBC (Auto)   


 


Urine RBC (Auto)   


 


Urine Bacteria   














  05/29/18 05/29/18 05/29/18





  16:40 16:40 16:40


 


WBC   


 


RBC   


 


Hgb   


 


Hct   


 


MCV   


 


MCH   


 


MCHC   


 


RDW   


 


Plt Count   


 


MPV   


 


Neutrophils %   


 


Lymphocytes %   


 


Monocytes %   


 


Eosinophils %   


 


Basophils %   


 


Nucleated RBC %   


 


PT with INR   


 


INR   


 


PTT (Actin FS)   


 


VBG pH   


 


POC VBG pCO2   


 


POC VBG pO2   


 


Mixed VBG HCO3   


 


Sodium  134 L  


 


Potassium  3.9  


 


Chloride  100  


 


Carbon Dioxide  24  


 


Anion Gap  10  


 


BUN  13  


 


Creatinine  1.3 H  


 


Creat Clearance w eGFR  41.24  


 


Random Glucose  120 H  


 


Lactic Acid   2.4 H* 


 


Calcium  8.6  


 


Total Bilirubin  0.8  D  


 


AST  53 H  


 


ALT  21  


 


Alkaline Phosphatase  86  


 


Troponin I    0.17 H


 


Total Protein  8.0  


 


Albumin  3.9  


 


TSH   


 


Urine Color   


 


Urine Appearance   


 


Urine pH   


 


Ur Specific Gravity   


 


Urine Protein   


 


Urine Glucose (UA)   


 


Urine Ketones   


 


Urine Blood   


 


Urine Nitrite   


 


Urine Bilirubin   


 


Urine Urobilinogen   


 


Ur Leukocyte Esterase   


 


Urine WBC (Auto)   


 


Urine RBC (Auto)   


 


Urine Bacteria   














  05/29/18 05/29/18 05/29/18





  16:48 16:59 18:30


 


WBC   


 


RBC   


 


Hgb   


 


Hct   


 


MCV   


 


MCH   


 


MCHC   


 


RDW   


 


Plt Count   


 


MPV   


 


Neutrophils %   


 


Lymphocytes %   


 


Monocytes %   


 


Eosinophils %   


 


Basophils %   


 


Nucleated RBC %   


 


PT with INR   


 


INR   


 


PTT (Actin FS)   


 


VBG pH   


 


POC VBG pCO2   


 


POC VBG pO2   


 


Mixed VBG HCO3   


 


Sodium   


 


Potassium   


 


Chloride   


 


Carbon Dioxide   


 


Anion Gap   


 


BUN   


 


Creatinine   


 


Creat Clearance w eGFR   


 


Random Glucose   


 


Lactic Acid    1.6


 


Calcium   


 


Total Bilirubin   


 


AST   


 


ALT   


 


Alkaline Phosphatase   


 


Troponin I   


 


Total Protein   


 


Albumin   


 


TSH   1.48 


 


Urine Color  Yellow  


 


Urine Appearance  Slcloudy  


 


Urine pH  5.0  


 


Ur Specific Gravity  1.012  


 


Urine Protein  2+ H  


 


Urine Glucose (UA)  Negative  


 


Urine Ketones  Negative  


 


Urine Blood  3+ H  


 


Urine Nitrite  Negative  


 


Urine Bilirubin  Negative  


 


Urine Urobilinogen  Negative  


 


Ur Leukocyte Esterase  Negative  


 


Urine WBC (Auto)  1  


 


Urine RBC (Auto)  44  


 


Urine Bacteria  Rare  











ASSESSMENT/PLAN:





62 year old  female with a hx of hypothyroidism is admitted to the 

hospital for septic shock





#Septic Shock: unclear etiology, could be UTI even though UA negative, mentally 

patient is improving compared to when she first came in


-f/u blood cultures


-f/u urine cultures


-UA negative


-ICU admission


-ID consult Dr. Bliss appreciated


-Levophed at 10mcgs, titrate to MAP > 65 --> improved to 123/94 () and 

pressures were stopped


-Vancomycin 1gm Q12


-Rocephin 2gm Q12


-Ampicillin 2gms Q6h


-IV NS @ 125cc/hr


-CT head no contrast


-CT abdomen pelvis no contrast to r/o abdominal collection/abscess/source of 

infection


-will likely need lumbar puncture and cell analysis/culture


-LA elevated on admission 2.4 --> repeat 1.6


-accurate I's and O's


-NPO for now


-O2 2L





#Troponinemia: troponin on admission was 1.23


-likely demand ischemia due to hypotension


-repeat troponin at 10pm





#Hypothyroidism





#FEN


IV NS @ 125cc/hr


Electrolytes WNL


NPO for now





#Prophylaxis


-heparin prophylaxis





#Disposition


-admit ICU


-initially patient wanted DNR, but he stated he would discuss with family 

before signing form





Visit type





- Emergency Visit


Emergency Visit: Yes


ED Registration Date: 05/29/18


Care time: The patient presented to the Emergency Department on the above date 

and was hospitalized for further evaluation of their emergent condition.





- New Patient


This patient is new to me today: Yes


Date on this admission: 05/29/18





- Critical Care


Critical Care patient: Yes


Total Critical Care Time (in minutes): 35


Critical Care Statement: The care of this patient involved high complexity 

decision making to prevent further life threatening deterioration of the patient

's condition and/or to evaluate & treat vital organ system(s) failure or risk 

of failure.





Hospitalist Screening





- Colonoscopy Questionnaire


Colonoscopy Questionnaire: 





Colonoscopy Questionnaire








-   Patient:


50 - 75 years old and never had a screening colonoscopy: Unknown


History of colon or rectal polyps, or CA: Unknown


History of IBD, Crohn's disease or UC: Unknown


History of abdominal radiation therapy as a child: Unknown





-   Relative:


1 with colon or rectal CA, or polyps at age 60 or younger: Unknown


Colon or rectal CA diagnosed at age 45 or younger: Unknown


Multiple relatives with colon or rectal CA: Unknown





-   Outcome:


Screening Result: Negative Screen

## 2018-05-29 NOTE — PN
Teaching Attending Note


Name of Resident: Benedict Alfaro





ATTENDING PHYSICIAN STATEMENT





I saw and evaluated the patient.


I reviewed the resident's note and discussed the case with the resident.


I agree with the resident's findings and plan as documented.








SUBJECTIVE:


Ms. Lock is a 62 year woman with pmh of hypothyroidism and lacunar infarct 

CVA who presents for evaluation of non-responsiveness and fever. As per son who 

is with her she was largely non-responsive when he went to check on her and 

extremely sweaty. In the ER she was hypotensive and febrile and was treated as 

a case of septic shock. She got IV NS, was started on levophed and sent to the 

ICU. There was a slight improvement in her mentation and her BP has improved.





OBJECTIVE:


Lethargic but arousable.


 Vital Signs











 Period  Temp  Pulse  Resp  BP Sys/Ariza  Pulse Ox


 


 Last 24 Hr  99.4 F-104.5 F    16-24  /27-44  











HEENT: No Jaundice, eye redness or discharge, PERRLA, EOMI. Normocephalic, 

atraumatic. External ears are normal and hearing is grossly intact. No nasal 

discharge.


Neck: Supple, nontender. No palpable adenopathy or thyromegaly. No JVD


Chest: Good effort. Clear to auscultation and percussion.


Heart: Regular. No S3, rub or murmur


Abdomen: Not distended, soft, nontender and no HSM. No rebound or guarding.  

Normoactive bowel sounds.


Ext: Peripheral pulses intact. No leg edema.


Skin: Warm and dry. No petechiae, rash or ecchymosis.


Neuro: Lethargic but arousable. Unable to follow commands. Sensation grossly 

intact in all four extremities and DTR are symmetric.





 Home Medications











 Medication  Instructions  Recorded


 


Levothyroxine [Synthroid -] 50 mcg PO DAILY@0700 #0 tablet 02/28/13


 


Pantoprazole Sodium [Protonix -] 40 mg PO DAILY #0 tablet.ec 02/28/13


 


Acetaminophen [Tylenol] 650 mg PO Q4H PRN 01/12/18


 


Ibuprofen [Motrin -] 600 mg PO TID PRN 01/12/18








 Current Medications











Generic Name Dose Route Start Last Admin





  Trade Name Freq  PRN Reason Stop Dose Admin


 


Dopamine HCl/Dextrose  400,000 mcg in 250 mls @ 8.165 mls/hr  05/29/18 17:30  05 /29/18 19:05





  Dopamine 400 Mg/D5w -  IVPB   0 mcg/kg/min





  TITR MARILEE   0 mls/hr





     Titration





     





  Protocol   





  5 MCG/KG/MIN   


 


Ceftriaxone Sodium 2,000 mg/  50 mls @ 100 mls/hr  05/29/18 19:02  





  Dextrose  IVPB  05/29/18 19:31  





  ONCE ONE   





     





     





     





     


 


Norepinephrine Bitartrate 4,  500 mls @ 37.5 mls/hr  05/29/18 19:15  05/29/18 18

:55





  000 mcg/ Dextrose  IV   8 mcg/min





  TITR MARILEE   60 mls/hr





     Administration





     





  Protocol   





  5 MCG/MIN   








 Abnormal Lab Results











  05/29/18 05/29/18 05/29/18





  16:40 16:40 16:40


 


PT with INR  13.90 H  


 


INR  1.23 H  


 


PTT (Actin FS)  48.3 H  


 


Sodium   134 L 


 


Creatinine   1.3 H 


 


Random Glucose   120 H 


 


Lactic Acid    2.4 H*


 


AST   53 H 


 


Troponin I   


 


Urine Protein   


 


Urine Blood   














  05/29/18 05/29/18





  16:40 16:48


 


PT with INR  


 


INR  


 


PTT (Actin FS)  


 


Sodium  


 


Creatinine  


 


Random Glucose  


 


Lactic Acid  


 


AST  


 


Troponin I  0.17 H 


 


Urine Protein   2+ H


 


Urine Blood   3+ H

















 Current Medications











Generic Name Dose Route Start Last Admin





  Trade Name Rubenq  PRN Reason Stop Dose Admin


 


Acetaminophen  650 mg  05/29/18 20:15  





  Tylenol -  PO   





  Q4H PRN   





  FEVER   





     





     





     


 


Chlorhexidine Gluconate  1 applic  05/29/18 22:00  





  Hibiclens For Decolonization -  TP   





  HS MARILEE   





     





     





     





     


 


Heparin Sodium (Porcine)  5,000 unit  05/30/18 02:00  





  Heparin -  SQ   





  Q8H-IV MARILEE   





     





     





     





     


 


Dopamine HCl/Dextrose  400,000 mcg in 250 mls @ 8.165 mls/hr  05/29/18 17:30  05 /29/18 19:05





  Dopamine 400 Mg/D5w -  IVPB   0 mcg/kg/min





  TITR MARILEE   0 mls/hr





     Titration





     





  Protocol   





  5 MCG/KG/MIN   


 


Norepinephrine Bitartrate 4,  500 mls @ 37.5 mls/hr  05/29/18 19:15  05/29/18 20

:15





  000 mcg/ Dextrose  IV   10 mcg/min





  TITR MARILEE   75 mls/hr





     Titration





     





  Protocol   





  5 MCG/MIN   


 


Sodium Chloride  1,000 mls @ 125 mls/hr  05/29/18 20:00  





  Normal Saline -  IV   





  ASDIR MARILEE   





     





     





     





     


 


Ceftriaxone Sodium  2 gm in 50 mls @ 100 mls/hr  05/29/18 20:15  





  Ceftriaxone 2 Gm-D5w Bag  IVPB   





  Q12H MARILEE   





     





     





  Protocol   





     


 


Vancomycin HCl 1,000 mg/  250 mls @ 166.667 mls/hr  05/29/18 20:15  





  Dextrose  IVPB   





  Q12H MARILEE   





     





     





  Protocol   





     


 


Ampicillin Sodium 2 gm/ Sodium  100 mls @ 200 mls/hr  05/29/18 21:00  





  Chloride  IVPB   





  Q6H-IV MARILEE   





     





     





  Protocol   





     


 


Vancomycin HCl 1,000 mg/  250 mls @ 166.667 mls/hr  05/30/18 05:00  





  Dextrose  IVPB  05/30/18 06:29  





  ONCE ONE   





     





     





     





     


 


Levothyroxine Sodium  50 mcg  05/30/18 07:00  





  Synthroid -  PO   





  DAILY@0700 [facility]   





     





     





     





     


 


Mupirocin  1 applic  05/29/18 22:00  





  Bactroban Ointment (For Decolonization) -  NS  06/03/18 21:59  





  BID [facility]   





     





     





     





     


 


Pantoprazole Sodium  40 mg  05/30/18 10:00  





  Protonix -  PO   





  DAILY [facility]   





     





     





     





     














ASSESSMENT AND PLAN:


1. Septic shock - Patient is being admitted as an inpatient. Will get head CT 

scan and spinal tap to rule out meningitis. There is no other obvious source of 

infection. Will continue IV NS for BP support and mild ANGIE, monitor lactic acid 

and commence empiric treatment for meningitis with Vancomycin, Ampicilin and 

Rocephin pending results of sepsis work up. Check urine toxicology screen. 

There is also the possibility that she has heat stroke which will be addressed 

with continued IV fluid support and monitor CPK/Temp. Elevated troponin likely 

due to demand ischemia. No EKG changes suggestive of ACS.





2. DVT prophylaxis - Heparin 5000u sq tid





3. Advance directives - Full code

## 2018-05-29 NOTE — PDOC
History of Present Illness





- General


Chief Complaint: SIRS, Suspected/Possible


Stated Complaint: FEVER


Time Seen by Provider: 05/29/18 16:23





- History of Present Illness


Initial Comments: 





05/29/18 16:56


Ms. Lock is a 63 yo female w/ pmh of hypothyroidism who presents for 

evaluation of non-responsiveness and fever. Per son who is with her she was 

largely non-responsive when he went to check on her and extremely sweaty. She 

will not currently respond. Per son she was at baseline when she went to bed 

last night.





Allergies: NKDA





Past History





- Past Medical History


Allergies/Adverse Reactions: 


 Allergies











Allergy/AdvReac Type Severity Reaction Status Date / Time


 


No Known Allergies Allergy   Verified 05/29/18 17:24











Home Medications: 


Ambulatory Orders





Levothyroxine [Synthroid -] 50 mcg PO DAILY@0700 #0 tablet 02/28/13 


Pantoprazole Sodium [Protonix -] 40 mg PO DAILY #0 tablet.ec 02/28/13 


Acetaminophen [Tylenol] 650 mg PO Q4H PRN 01/12/18 


Ibuprofen [Motrin -] 600 mg PO TID PRN 01/12/18 








CVA: Yes (H/O LACUNAR INFARCT)


COPD: No


Thyroid Disease: Yes





- Suicide/Smoking/Psychosocial Hx


Smoking Status: No


Smoking History: Never smoked


Number of Cigarettes Smoked Daily: 0


Information on smoking cessation initiated: No


Hx Alcohol Use: No


Drug/Substance Use Hx: No


Substance Use Type: None





**Review of Systems





- Review of Systems


Comments:: 





05/29/18 16:57


Unable to obtain ROS further.





*Physical Exam





- Vital Signs


 Last Vital Signs











Temp Pulse Resp BP Pulse Ox


 


    119 H  16   70/40   93 L


 


    05/29/18 16:25  05/29/18 16:25  05/29/18 16:25  05/29/18 16:25














- Physical Exam


Comments: 





05/29/18 16:57


GENERAL: +Patient minimally responsive to stimulation.


HEAD: No signs of trauma, normocephalic, atraumatic 


EYES: PERRLA, EOMI, sclera anicteric, conjunctiva clear


ENT: Auricles normal inspection, hearing grossly normal, nares patent, 

oropharynx clear without


exudates. Moist mucosa


NECK: Normal ROM, supple, no lymphadenopathy, JVD, or masses


LUNGS: No distress, speaks full sentences, clear to auscultation bilaterally 


HEART: Regular rate and rhythm, normal S1 and S2, no murmurs, rubs or gallops, 

peripheral pulses normal and equal bilaterally. 


ABDOMEN: Soft, nontender, normoactive bowel sounds. No guarding, no rebound. No 

masses


EXTREMITIES: Normal inspection, Normal range of motion, no edema. No clubbing 

or cyanosis. 


NEUROLOGICAL: +Unable to assess


SKIN: +Hot, diaphoretic, normal turgor, no rashes or lesions noted. 





ED Treatment Course





- LABORATORY


CBC & Chemistry Diagram: 


 05/29/18 16:40





 05/29/18 16:40





Medical Decision Making





- Medical Decision Making





05/29/18 18:48


Ms. Lock is a 63 yo female w/ pmh as described who presents in acute sepsis. 

Sepsis protocol started. Central line placed for persistant hypotension after 

4L NS.





Patient signed out to Dr. Rascon for further care.





*DC/Admit/Observation/Transfer


Diagnosis at time of Disposition: 


 Septic shock








- Discharge Dispostion


Decision to Admit order: Yes





- Referrals





- Patient Instructions





- Post Discharge Activity

## 2018-05-29 NOTE — PDOC
Rapid Medical Evaluation


Time Seen by Provider: 05/29/18 16:23


Medical Evaluation: 


 Allergies











Allergy/AdvReac Type Severity Reaction Status Date / Time


 


No Known Allergies Allergy   Verified 01/12/18 22:00











05/29/18 16:34


Pt. is a 61 y/o F who presents altered with her family. Son carried her in to 

the ED. Not answering questions. States that she may have the flu as she was 

out in the rain. Was normal yesterday.


Exam: wheel chair bound, AAOx0. CTAB. Warm to the touch. BP 70/40


Orders: Sepsis order set


Pt. to proceed to main ED for further evaluation

## 2018-05-30 LAB
ANION GAP SERPL CALC-SCNC: 8 MMOL/L (ref 8–16)
APPEARANCE CSF: CLEAR
BASOPHILS # BLD: 0.4 % (ref 0–2)
BUN SERPL-MCNC: 11 MG/DL (ref 7–18)
CALCIUM SERPL-MCNC: 6.7 MG/DL (ref 8.5–10.1)
CHLORIDE SERPL-SCNC: 108 MMOL/L (ref 98–107)
CO2 SERPL-SCNC: 20 MMOL/L (ref 21–32)
COLOR CSF: COLORLESS
CREAT SERPL-MCNC: 1 MG/DL (ref 0.55–1.02)
DEPRECATED RDW RBC AUTO: 13.8 % (ref 11.6–15.6)
EOSINOPHIL # BLD: 1.9 % (ref 0–4.5)
GLUCOSE CSF-MCNC: 112 MG/DL (ref 50–80)
GLUCOSE SERPL-MCNC: 172 MG/DL (ref 74–106)
HCT VFR BLD CALC: 32.6 % (ref 32.4–45.2)
HGB BLD-MCNC: 11.2 GM/DL (ref 10.7–15.3)
LYMPHOCYTES # BLD: 20.7 % (ref 8–40)
MAGNESIUM SERPL-MCNC: 1.5 MG/DL (ref 1.8–2.4)
MCH RBC QN AUTO: 30.9 PG (ref 25.7–33.7)
MCHC RBC AUTO-ENTMCNC: 34.5 G/DL (ref 32–36)
MCV RBC: 89.5 FL (ref 80–96)
MONOCYTES # BLD AUTO: 7.1 % (ref 3.8–10.2)
NEUTROPHILS # BLD: 69.9 % (ref 42.8–82.8)
PHOSPHATE SERPL-MCNC: 2.1 MG/DL (ref 2.5–4.9)
PLATELET # BLD AUTO: 210 K/MM3 (ref 134–434)
PMV BLD: 7.6 FL (ref 7.5–11.1)
POTASSIUM SERPLBLD-SCNC: 3.5 MMOL/L (ref 3.5–5.1)
RBC # BLD AUTO: 3.64 M/MM3 (ref 3.6–5.2)
SODIUM SERPL-SCNC: 136 MMOL/L (ref 136–145)
WBC # BLD AUTO: 8.6 K/MM3 (ref 4–10)
WBC # CSF: 13 /UL

## 2018-05-30 PROCEDURE — 009U3ZX DRAINAGE OF SPINAL CANAL, PERCUTANEOUS APPROACH, DIAGNOSTIC: ICD-10-PCS | Performed by: INTERNAL MEDICINE

## 2018-05-30 RX ADMIN — MUPIROCIN SCH APPLIC: 20 OINTMENT TOPICAL at 10:55

## 2018-05-30 RX ADMIN — DEXTROSE MONOHYDRATE SCH MLS/HR: 50 INJECTION, SOLUTION INTRAVENOUS at 20:05

## 2018-05-30 RX ADMIN — SODIUM CHLORIDE SCH MLS/HR: 9 INJECTION, SOLUTION INTRAVENOUS at 20:05

## 2018-05-30 RX ADMIN — CLINDAMYCIN PHOSPHATE SCH MLS/HR: 600 INJECTION, SOLUTION INTRAVENOUS at 17:27

## 2018-05-30 RX ADMIN — AMPICILLIN SODIUM SCH MLS/HR: 2 INJECTION, POWDER, FOR SOLUTION INTRAMUSCULAR; INTRAVENOUS at 02:02

## 2018-05-30 RX ADMIN — SODIUM CHLORIDE SCH MLS/HR: 9 INJECTION, SOLUTION INTRAVENOUS at 08:15

## 2018-05-30 RX ADMIN — ACETAMINOPHEN PRN MG: 10 INJECTION, SOLUTION INTRAVENOUS at 02:41

## 2018-05-30 RX ADMIN — HEPARIN SODIUM SCH UNIT: 5000 INJECTION, SOLUTION INTRAVENOUS; SUBCUTANEOUS at 01:48

## 2018-05-30 RX ADMIN — CHLORHEXIDINE GLUCONATE SCH APPLIC: 213 SOLUTION TOPICAL at 21:20

## 2018-05-30 RX ADMIN — ACETAMINOPHEN PRN MG: 10 INJECTION, SOLUTION INTRAVENOUS at 16:32

## 2018-05-30 RX ADMIN — ACETAMINOPHEN PRN MG: 325 TABLET ORAL at 01:48

## 2018-05-30 RX ADMIN — METHYLPREDNISOLONE SODIUM SUCCINATE SCH MG: 40 INJECTION, POWDER, FOR SOLUTION INTRAMUSCULAR; INTRAVENOUS at 17:27

## 2018-05-30 RX ADMIN — VANCOMYCIN HYDROCHLORIDE SCH MLS/HR: 750 INJECTION, POWDER, LYOPHILIZED, FOR SOLUTION INTRAVENOUS at 21:19

## 2018-05-30 RX ADMIN — VANCOMYCIN HYDROCHLORIDE SCH MLS/HR: 750 INJECTION, POWDER, LYOPHILIZED, FOR SOLUTION INTRAVENOUS at 09:32

## 2018-05-30 RX ADMIN — CEFTRIAXONE SODIUM SCH MLS/HR: 2 INJECTION, POWDER, FOR SOLUTION INTRAMUSCULAR; INTRAVENOUS at 18:20

## 2018-05-30 RX ADMIN — PANTOPRAZOLE SODIUM SCH MG: 40 TABLET, DELAYED RELEASE ORAL at 09:34

## 2018-05-30 RX ADMIN — HEPARIN SODIUM SCH UNIT: 5000 INJECTION, SOLUTION INTRAVENOUS; SUBCUTANEOUS at 09:34

## 2018-05-30 RX ADMIN — CLINDAMYCIN PHOSPHATE SCH MLS/HR: 600 INJECTION, SOLUTION INTRAVENOUS at 09:33

## 2018-05-30 RX ADMIN — DEXTROSE MONOHYDRATE SCH MLS/HR: 50 INJECTION, SOLUTION INTRAVENOUS at 08:15

## 2018-05-30 RX ADMIN — MUPIROCIN SCH APPLIC: 20 OINTMENT TOPICAL at 21:20

## 2018-05-30 RX ADMIN — METHYLPREDNISOLONE SODIUM SUCCINATE SCH MG: 40 INJECTION, POWDER, FOR SOLUTION INTRAMUSCULAR; INTRAVENOUS at 14:57

## 2018-05-30 RX ADMIN — ACYCLOVIR SODIUM SCH: 50 INJECTION, SOLUTION INTRAVENOUS at 18:09

## 2018-05-30 NOTE — PN
Progress Note (short form)





- Note


Progress Note: 





ID








Neurology note read with interest regarding prior history of meningitis 

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








Fever recurrent  Earlier today seems quite alert and able to answer questions 

appropriately Neck seemed supple





No question the LP seems abnormal as below





Glucose 112  Protein 79  WBC 13 mostly lymphs








Gram stain no polys NOS





RPR neg





HIV ??











Assessment  CSF consistent with viral disease and given history of prior 

meningitis Herpes Simplex certainly a consideration





Plan              Agree with addition of Acyclovir 10mg/kg/8hours


                  Quant gold and HIV


                  Viral culture


                  Discussed with resident





Ruthy FRANKLIN 





Problem List





- Problems


(1) Septic shock


Code(s): A41.9 - SEPSIS, UNSPECIFIED ORGANISM; R65.21 - SEVERE SEPSIS WITH 

SEPTIC SHOCK

## 2018-05-30 NOTE — PN
Progress Note (short form)





- Note


Progress Note: 





ID








This is a 62 year old female of [place] descent living [place] the [place] with 

her family brought to ICU in septic shock. She lives at home with family and 

seemed fine all day yesterday right into the evening. During the night she was 

noted to be unresponsive and brought to ER where noted to be in septic shock 

febrile hypotensive hypoxic.  Empiric antibiotics given and the question of 

meningitis raised given Amp Vanco and Ceftriaxone.  Currently the patient is on 

pressors. She is experiencing nausea and having vomiting but denies abd pain. 

She did have a CT of the abd apparently negative and head CT negative though 

not read officially.  She denies SOB couph urinary complaints She does complain 

of a "bad tooth upper left molar.  The left side of her face is swollen around 

her eye especially though she did not come in with this. There is no history of


travel. SHe has hypothyroidism as her major medical issues and no allergies to 

meds.


 Selected Entries











  05/29/18 05/29/18 05/30/18





  16:25 16:30 05:00


 


Temperature  104.5 F H 98.7 F


 


Pulse Rate   


 


Respiratory   22





Rate   


 


Blood Pressure 70/40  


 


O2 Sat by Pulse 93 L  





Oximetry (%)   














  05/30/18





  05:25


 


Temperature 


 


Pulse Rate 76


 


Respiratory 





Rate 


 


Blood Pressure 105/49


 


O2 Sat by Pulse 





Oximetry (%) 








Ill appearing but in no distress


HEENT  left periorbital swelling as well as generalized facial swelling Tooth 

upper molar appears broken with gingivitis all teeth


          NO localized dental tenderness





Neck  Supple


Lung  Clear


Cor    S1 S2 RR


Abd    Soft nontender BS pos


Ext     NO CCE 


Microbiology








 Laboratory Tests











  05/29/18 05/29/18 05/29/18





  16:40 16:40 16:40


 


WBC  8.0  D  


 


Hgb  14.3  D  


 


Hct  42.4  D  


 


Plt Count  266  D  


 


MPV  7.5  


 


INR   1.23 H 


 


BUN    13


 


Creatinine    1.3 H


 


Lactic Acid   


 


AST    53 H


 


ALT    21


 


Alkaline Phosphatase    86


 


Troponin I   


 


Urine WBC (Auto)   


 


Urine RBC (Auto)   














  05/29/18 05/29/18 05/29/18





  16:40 16:40 16:48


 


WBC   


 


Hgb   


 


Hct   


 


Plt Count   


 


MPV   


 


INR   


 


BUN   


 


Creatinine   


 


Lactic Acid  2.4 H*  


 


AST   


 


ALT   


 


Alkaline Phosphatase   


 


Troponin I   0.17 H 


 


Urine WBC (Auto)    1


 


Urine RBC (Auto)    44








Assessment Sepsis unknown source. Given the rapidity of her presentation must 

consider bacterial disease Staph Strep GNB


                   Also has poor dentition broken molar ? infected another 

possible source anaerobic sepsis Fusobacterium ??  With   


                   normal CBC viral disease considered though this this is alot 

of acute illness and very rapid for that.  I do not think she 


                   has meningitis.   Lastly she has what looks to me to have 

angioedema ? PCN related given here as she did not come in 


                   with this.





Plan Pending cultures Vanco  750mg q 12 Ceftriaxone 2 grs daily Clinda 600 q 6 

CRP HIV test


Ruthy FRANKLIN





Problem List





- Problems


(1) Septic shock


Code(s): A41.9 - SEPSIS, UNSPECIFIED ORGANISM; R65.21 - SEVERE SEPSIS WITH 

SEPTIC SHOCK

## 2018-05-30 NOTE — CON.CARD
Consult





- History of Present Illness


History of Present Illness: 





This is a 62 year old female of [place] descent living [place] with 

her family brought to ICU in septic shock. She lives at home with family and 

seemed fine all day yesterday right into the evening. During the night she was 

noted to be unresponsive and brought to ER where noted to be in septic shock 

febrile hypotensive hypoxic.  Empiric antibiotics given and the question of 

meningitis raised given Amp Vanco and Ceftriaxone.  Currently the patient is on 

pressors. She is experiencing nausea and having vomiting but denies abd pain. 

She did have a CT of the abd apparently negative and head CT negative though 

not read officially.  She denies SOB couph urinary complaints She does complain 

of a "bad tooth upper left molar.  The left side of her face is swollen around 

her eye especially though she did not come in with this. There is no history of


travel. SHe has hypothyroidism as her major medical issues and no allergies to 

meds.





- Past Medical History


Endocrine: Yes: Hypothyroidism





- Alcohol/Substance Use


Hx Alcohol Use: No





- Smoking History


Smoking history: Never smoked


Aproximately how many cigarettes per day: 0





Home Medications





- Allergies


Allergies/Adverse Reactions: 


 Allergies











Allergy/AdvReac Type Severity Reaction Status Date / Time


 


No Known Allergies Allergy   Verified 05/29/18 17:24














- Home Medications


Home Medications: 


Ambulatory Orders





Levothyroxine [Synthroid -] 50 mcg PO DAILY@0700 #0 tablet 02/28/13 


Pantoprazole Sodium [Protonix -] 40 mg PO DAILY #0 tablet.ec 02/28/13 


Acetaminophen [Tylenol] 650 mg PO Q4H PRN 01/12/18 


Ibuprofen [Motrin -] 600 mg PO TID PRN 01/12/18 








Vital Signs: 


 Vital Signs











Temperature  98.3 F   05/30/18 08:00


 


Pulse Rate  76   05/30/18 08:15


 


Respiratory Rate  16   05/30/18 08:00


 


Blood Pressure  94/39   05/30/18 08:15


 


O2 Sat by Pulse Oximetry (%)  100   05/30/18 08:36











Constitutional: Yes: Well Nourished, No Distress, Calm


Eyes: Yes: WNL, Conjunctiva Clear, EOM Intact


HENT: Yes: WNL, Atraumatic, Normocephalic


Neck: Yes: WNL, Supple, Trachea Midline


Respiratory: Yes: WNL, Regular, CTA Bilaterally


Gastrointestinal: Yes: WNL, Normal Bowel Sounds


Renal/: Yes: WNL


Cardiovascular: Yes: WNL, Regular Rate and Rhythm


Musculoskeletal: Yes: WNL


Extremities: Yes: WNL


Integumentary: Yes: WNL


Neurological: Yes: Alert, Lethargy


...Motor Strength: WNL


Psychiatric: Yes: WNL, Alert, Oriented





- Other Data


Labs, Other Data: 


 CBC, BMP





 05/30/18 05:30 





 05/30/18 05:30 





 INR, PTT











INR  1.23  (0.82-1.09)  H  05/29/18  16:40    








 Troponin, BNP











  05/29/18 05/30/18 05/30/18





  16:40 00:39 05:30


 


Troponin I  0.17 H  0.30 H  0.21 H














  05/30/18





  06:00


 


Troponin I  Cancelled








 Troponin, BNP











  05/29/18 05/30/18 05/30/18





  16:40 00:39 05:30


 


Troponin I  0.17 H  0.30 H  0.21 H














  05/30/18





  06:00


 


Troponin I  Cancelled














Imaging





- Results


Chest X-ray: Image Reviewed (no i/e)


EKG: Image Reviewed (s tachy rep abn)





Problem List





- Problems


(1) Septic shock


Code(s): A41.9 - SEPSIS, UNSPECIFIED ORGANISM; R65.21 - SEVERE SEPSIS WITH 

SEPTIC SHOCK   





(2) Gastroenteritis


Code(s): K52.9 - NONINFECTIVE GASTROENTERITIS AND COLITIS, UNSPECIFIED   





(3) Headache


Code(s): R51 - HEADACHE   





(4) Influenza


Code(s): J11.1 - FLU DUE TO UNIDENTIFIED INFLUENZA VIRUS W OTH RESP MANIFEST   





(5) Nausea and vomiting


Code(s): R11.2 - NAUSEA WITH VOMITING, UNSPECIFIED   





Assessment/Plan





Altered Mental Status


Septic Shock of unclear source


r/o Acute CVA vs Encephalitis/Meningitis


Lactic Acidosis


+Troponins likely secondary to septic shock - doubt primary mi


Acute Kidney Injury


Hypothyroidism








Plan





cycle CE


echo


supportive rx asper ID Neuro and ICU team








cc time spent 70 min

## 2018-05-30 NOTE — PROC
<Mayte Santiago - Last Filed: 05/30/18 14:00>





Lumbar Puncture


Risks and Benefits Explained: Yes


Consent on Chart: Yes


Sterile Technique: Yes


Skin prep: Chlorhexidine


Position: Sitting


Site: [id number]


Local Anesthesia: 1% Lidocaine with epi


Opening Pressure(mmHg): 21


CSF Color, Appearance: Clear


Sterile Dressing Applied: Yes





<Onesimo Reyes MD - Last Filed: 05/30/18 14:02>





Procedure Note


Procedure: 





I supervised and was present during the entire procedure.





Onesimo Reyes MD

## 2018-05-30 NOTE — EKG
Test Reason : 

Blood Pressure : ***/*** mmHG

Vent. Rate : 111 BPM     Atrial Rate : 111 BPM

   P-R Int : 170 ms          QRS Dur : 096 ms

    QT Int : 362 ms       P-R-T Axes : 071 082 055 degrees

   QTc Int : 492 ms

 

SINUS TACHYCARDIA

NONSPECIFIC ST AND T WAVE ABNORMALITY

ABNORMAL ECG

WHEN COMPARED WITH ECG OF 12-JAN-2018 22:11,

VENT. RATE HAS INCREASED BY  47 BPM

Confirmed by NOAM FRANKLIN, RACHEL ([id number]) on 5/30/2018 9:38:31 AM

 

Referred By:             Confirmed By:RACHEL SANTACRUZ MD

## 2018-05-30 NOTE — PN
Physical Exam: 


SUBJECTIVE: Patient seen and examined lethargic; left eye swelling; lip swelling

, fever overnight. 








OBJECTIVE:





 Vital Signs











 Period  Temp  Pulse  Resp  BP Sys/Ariza  Pulse Ox


 


 Last 24 Hr  98.3 F-104.5 F    12-24  /27-71  











GENERAL: The patient is lethargic but arousable


HEAD: Normal with no signs of trauma.


EYES: right eye swollen shut


ENT: oropharynx not visible due to swelling 


NECK: Trachea midline, full range of motion, supple. 


LUNGS: Breath sounds equal, clear to auscultation bilaterally, no wheezes, no 

crackles, no 


accessory muscle use. 


HEART: tachy and reg rhythm, S1, S2 without murmur, rub or gallop.


ABDOMEN: Soft, nontender, nondistended, normoactive bowel sounds, no guarding, 

no 


rebound, no hepatosplenomegaly, no masses.


EXTREMITIES: 2+ pulses, warm, well-perfused, no edema. 


NEUROLOGICAL: Cranial nerves II through XII grossly intact. slowed speech, gait 

not 


observed. strength ; /5 throughout muscle group; sensation intact; no nuchal 

rigidity


SKIN: Warm, dry, normal turgor, no rashes or lesions noted














 Laboratory Results - last 24 hr











  05/29/18 05/29/18 05/29/18





  16:40 16:40 16:40


 


WBC  8.0  D  


 


RBC  4.75  


 


Hgb  14.3  D  


 


Hct  42.4  D  


 


MCV  89.2  


 


MCH  30.1  


 


MCHC  33.7  


 


RDW  13.6  


 


Plt Count  266  D  


 


MPV  7.5  


 


Neutrophils %  62.9  D  


 


Lymphocytes %  28.5  D  


 


Monocytes %  6.8  


 


Eosinophils %  0.8  D  


 


Basophils %  1.0  


 


Nucleated RBC %  0  


 


PT with INR   13.90 H 


 


INR   1.23 H 


 


PTT (Actin FS)   48.3 H 


 


VBG pH    7.35


 


POC VBG pCO2    44.2


 


POC VBG pO2    34.9


 


Mixed VBG HCO3    24.0


 


Sodium   


 


Potassium   


 


Chloride   


 


Carbon Dioxide   


 


Anion Gap   


 


BUN   


 


Creatinine   


 


Creat Clearance w eGFR   


 


Random Glucose   


 


Lactic Acid   


 


Calcium   


 


Phosphorus   


 


Magnesium   


 


Total Bilirubin   


 


AST   


 


ALT   


 


Alkaline Phosphatase   


 


Creatine Kinase   


 


Creatine Kinase Index   


 


CK-MB (CK-2)   


 


Troponin I   


 


Total Protein   


 


Albumin   


 


Vitamin B12   


 


TSH   


 


Urine Color   


 


Urine Appearance   


 


Urine pH   


 


Ur Specific Gravity   


 


Urine Protein   


 


Urine Glucose (UA)   


 


Urine Ketones   


 


Urine Blood   


 


Urine Nitrite   


 


Urine Bilirubin   


 


Urine Urobilinogen   


 


Ur Leukocyte Esterase   


 


Urine WBC (Auto)   


 


Urine RBC (Auto)   


 


Urine Bacteria   


 


CSF Glucose   


 


CSF Total Protein   














  05/29/18 05/29/18 05/29/18





  16:40 16:40 16:40


 


WBC   


 


RBC   


 


Hgb   


 


Hct   


 


MCV   


 


MCH   


 


MCHC   


 


RDW   


 


Plt Count   


 


MPV   


 


Neutrophils %   


 


Lymphocytes %   


 


Monocytes %   


 


Eosinophils %   


 


Basophils %   


 


Nucleated RBC %   


 


PT with INR   


 


INR   


 


PTT (Actin FS)   


 


VBG pH   


 


POC VBG pCO2   


 


POC VBG pO2   


 


Mixed VBG HCO3   


 


Sodium  134 L  


 


Potassium  3.9  


 


Chloride  100  


 


Carbon Dioxide  24  


 


Anion Gap  10  


 


BUN  13  


 


Creatinine  1.3 H  


 


Creat Clearance w eGFR  41.24  


 


Random Glucose  120 H  


 


Lactic Acid   2.4 H* 


 


Calcium  8.6  


 


Phosphorus   


 


Magnesium   


 


Total Bilirubin  0.8  D  


 


AST  53 H  


 


ALT  21  


 


Alkaline Phosphatase  86  


 


Creatine Kinase   


 


Creatine Kinase Index   


 


CK-MB (CK-2)   


 


Troponin I    0.17 H


 


Total Protein  8.0  


 


Albumin  3.9  


 


Vitamin B12   


 


TSH   


 


Urine Color   


 


Urine Appearance   


 


Urine pH   


 


Ur Specific Gravity   


 


Urine Protein   


 


Urine Glucose (UA)   


 


Urine Ketones   


 


Urine Blood   


 


Urine Nitrite   


 


Urine Bilirubin   


 


Urine Urobilinogen   


 


Ur Leukocyte Esterase   


 


Urine WBC (Auto)   


 


Urine RBC (Auto)   


 


Urine Bacteria   


 


CSF Glucose   


 


CSF Total Protein   














  05/29/18 05/29/18 05/29/18





  16:48 16:59 18:30


 


WBC   


 


RBC   


 


Hgb   


 


Hct   


 


MCV   


 


MCH   


 


MCHC   


 


RDW   


 


Plt Count   


 


MPV   


 


Neutrophils %   


 


Lymphocytes %   


 


Monocytes %   


 


Eosinophils %   


 


Basophils %   


 


Nucleated RBC %   


 


PT with INR   


 


INR   


 


PTT (Actin FS)   


 


VBG pH   


 


POC VBG pCO2   


 


POC VBG pO2   


 


Mixed VBG HCO3   


 


Sodium   


 


Potassium   


 


Chloride   


 


Carbon Dioxide   


 


Anion Gap   


 


BUN   


 


Creatinine   


 


Creat Clearance w eGFR   


 


Random Glucose   


 


Lactic Acid    1.6


 


Calcium   


 


Phosphorus   


 


Magnesium   


 


Total Bilirubin   


 


AST   


 


ALT   


 


Alkaline Phosphatase   


 


Creatine Kinase   


 


Creatine Kinase Index   


 


CK-MB (CK-2)   


 


Troponin I   


 


Total Protein   


 


Albumin   


 


Vitamin B12   


 


TSH   1.48 


 


Urine Color  Yellow  


 


Urine Appearance  Slcloudy  


 


Urine pH  5.0  


 


Ur Specific Gravity  1.012  


 


Urine Protein  2+ H  


 


Urine Glucose (UA)  Negative  


 


Urine Ketones  Negative  


 


Urine Blood  3+ H  


 


Urine Nitrite  Negative  


 


Urine Bilirubin  Negative  


 


Urine Urobilinogen  Negative  


 


Ur Leukocyte Esterase  Negative  


 


Urine WBC (Auto)  1  


 


Urine RBC (Auto)  44  


 


Urine Bacteria  Rare  


 


CSF Glucose   


 


CSF Total Protein   














  05/30/18 05/30/18 05/30/18





  00:39 05:30 05:30


 


WBC   8.6 


 


RBC   3.64  D 


 


Hgb   11.2  D 


 


Hct   32.6  D 


 


MCV   89.5 


 


MCH   30.9 


 


MCHC   34.5 


 


RDW   13.8 


 


Plt Count   210  D 


 


MPV   7.6 


 


Neutrophils %   69.9 


 


Lymphocytes %   20.7  D 


 


Monocytes %   7.1 


 


Eosinophils %   1.9  D 


 


Basophils %   0.4 


 


Nucleated RBC %   0 


 


PT with INR   


 


INR   


 


PTT (Actin FS)   


 


VBG pH   


 


POC VBG pCO2   


 


POC VBG pO2   


 


Mixed VBG HCO3   


 


Sodium    136


 


Potassium    3.5


 


Chloride    108 H


 


Carbon Dioxide    20 L


 


Anion Gap    8


 


BUN    11


 


Creatinine    1.0


 


Creat Clearance w eGFR   


 


Random Glucose    172 H


 


Lactic Acid   


 


Calcium    6.7 L*


 


Phosphorus    2.1 L


 


Magnesium    1.5 L


 


Total Bilirubin   


 


AST   


 


ALT   


 


Alkaline Phosphatase   


 


Creatine Kinase  291 H   424 H


 


Creatine Kinase Index  1.4   1.8


 


CK-MB (CK-2)  4.177 H   7.663 H


 


Troponin I  0.30 H   0.21 H


 


Total Protein   


 


Albumin   


 


Vitamin B12   


 


TSH   


 


Urine Color   


 


Urine Appearance   


 


Urine pH   


 


Ur Specific Gravity   


 


Urine Protein   


 


Urine Glucose (UA)   


 


Urine Ketones   


 


Urine Blood   


 


Urine Nitrite   


 


Urine Bilirubin   


 


Urine Urobilinogen   


 


Ur Leukocyte Esterase   


 


Urine WBC (Auto)   


 


Urine RBC (Auto)   


 


Urine Bacteria   


 


CSF Glucose   


 


CSF Total Protein   














  05/30/18 05/30/18 05/30/18





  06:00 08:00 14:00


 


WBC   


 


RBC   


 


Hgb   


 


Hct   


 


MCV   


 


MCH   


 


MCHC   


 


RDW   


 


Plt Count   


 


MPV   


 


Neutrophils %   


 


Lymphocytes %   


 


Monocytes %   


 


Eosinophils %   


 


Basophils %   


 


Nucleated RBC %   


 


PT with INR   


 


INR   


 


PTT (Actin FS)   


 


VBG pH   


 


POC VBG pCO2   


 


POC VBG pO2   


 


Mixed VBG HCO3   


 


Sodium   


 


Potassium   


 


Chloride   


 


Carbon Dioxide   


 


Anion Gap   


 


BUN   


 


Creatinine   


 


Creat Clearance w eGFR   


 


Random Glucose   


 


Lactic Acid   


 


Calcium   


 


Phosphorus   


 


Magnesium   


 


Total Bilirubin   


 


AST   


 


ALT   


 


Alkaline Phosphatase   


 


Creatine Kinase  Cancelled  


 


Creatine Kinase Index   


 


CK-MB (CK-2)   


 


Troponin I  Cancelled  


 


Total Protein   


 


Albumin   


 


Vitamin B12   Cancelled 


 


TSH   


 


Urine Color   


 


Urine Appearance   


 


Urine pH   


 


Ur Specific Gravity   


 


Urine Protein   


 


Urine Glucose (UA)   


 


Urine Ketones   


 


Urine Blood   


 


Urine Nitrite   


 


Urine Bilirubin   


 


Urine Urobilinogen   


 


Ur Leukocyte Esterase   


 


Urine WBC (Auto)   


 


Urine RBC (Auto)   


 


Urine Bacteria   


 


CSF Glucose    112 H


 


CSF Total Protein    79 H








Active Medications











Generic Name Dose Route Start Last Admin





  Trade Name Freq  PRN Reason Stop Dose Admin


 


Acetaminophen  650 mg  05/29/18 20:15  05/30/18 01:48





  Tylenol -  PO   650 mg





  Q4H PRN   Administration





  FEVER   





     





     





     


 


Acetaminophen  1,000 mg  05/30/18 02:09  05/30/18 02:41





  Ofirmev Injection -  IVPB   1,000 mg





  Q8H PRN   Administration





  FEVER   





     





     





     


 


Chlorhexidine Gluconate  1 applic  05/29/18 22:00  05/29/18 22:49





  Hibiclens For Decolonization -  TP   1 applic





  HS MARILEE   Administration





     





     





     





     


 


Diphenhydramine HCl  25 mg  05/30/18 14:45  05/30/18 14:57





  Benadryl Injection -  IVPUSH   25 mg





  Q6H MARILEE   Administration





     





     





     





     


 


Norepinephrine Bitartrate 4,  500 mls @ 37.5 mls/hr  05/29/18 19:15  05/30/18 08

:15





  000 mcg/ Dextrose  IV   20 mcg/min





  TITR MARILEE   150 mls/hr





     Administration





     





  Protocol   





  5 MCG/MIN   


 


Sodium Chloride  1,000 mls @ 125 mls/hr  05/29/18 20:00  05/30/18 08:15





  Normal Saline -  IV   125 mls/hr





  ASDIR MARILEE   Administration





     





     





     





     


 


Vancomycin HCl 750 mg/  250 mls @ 250 mls/hr  05/30/18 09:00  05/30/18 09:32





  Dextrose  IVPB   250 mls/hr





  BID@0900,2100 MARILEE   Administration





     





     





     





     


 


Ceftriaxone Sodium 2 gm/  100 mls @ 200 mls/hr  05/30/18 10:00  05/30/18 09:34





  Dextrose  IVPB   200 mls/hr





  DAILY MARILEE   Administration





     





     





  Protocol   





     


 


Clindamycin Phosphate  600 mg in 50 mls @ 100 mls/hr  05/30/18 10:00  05/30/18 

09:33





  Cleocin 600 Mg Premix Ivpb -  IVPB   100 mls/hr





  Q8H-IV MARILEE   Administration





     





     





  Protocol   





     


 


Famotidine  20 mg in 12 mls @ 144 mls/hr  05/30/18 22:00  





  Pepcid 20 Mg/12 Ml Push  IVPUSH   





  BID MARILEE   





     





     





     





     


 


Levothyroxine Sodium  25 mcg  05/30/18 10:00  05/30/18 11:42





  Synthroid Injection -  IVPUSH   25 mcg





  DAILY MARILEE   Administration





     





     





     





     


 


Methylprednisolone Sodium Succinate  40 mg  05/30/18 14:45  05/30/18 14:57





  Solu-Medrol -  IVPUSH   40 mg





  Q8H-IV MARILEE   Administration





     





     





     





     


 


Mupirocin  1 applic  05/29/18 22:00  05/30/18 10:55





  Bactroban Ointment (For Decolonization) -  NS  06/03/18 21:59  1 applic





  BID MARILEE   Administration





     





     





     





     


 


Ondansetron HCl  4 mg  05/30/18 08:51  





  Zofran Injection  IVPUSH   





  Q6H PRN   





  NAUSEA AND/OR VOMITING   





     





     





     


 


Pantoprazole Sodium  40 mg  05/30/18 10:00  05/30/18 09:34





  Protonix -  PO   40 mg





  DAILY MARILEE   Administration





     





     





     





     











ASSESSMENT/PLAN:


This is a 62 year old female with a history of hypothyroid, although not on any 

medications at home. Was brought in by son, due to lethargy, vomiting and fever 

of 104 at home. IN ER patient found to be in septic shock, current infectious 

source is unknown, r/o bacterial menigitis, acute cva, other infectious 

etiology. 





#Septic shock; unknown source; r/o bacterial meningitis;


-pan culture. viral cultures; influenza


-titrate pressers; keep MAP >65


-IV antibiotics; 


-LP; f/u csf findings


-strict I/O


-cvp; keep 8-10








#AMS; r/o cva


-head CT suggestive of possible frontal/temporal acute vs subacute cva


-no focal deficits on PE


-f/u brain MRI w /wo contrast


-echo


-carotid doppler


-if acute stroke ; will follow stroke protocol


-check lipid panel


-neurology on board





#angioedema


-stat IV solumedrol 125mg given


-will continue 40mg IV q8


-standing benedryl 25mg q6h for now


-npo due to swelling


-aspiration precautions


-bedside swallow eval when appropriate





#Hypothyroid:


-tsh wnl


-not on home meds


-was started on old dose of levothyroxine





DVT ppl


GI ppl





Disposition: cont ICU monitoring
































Visit type





- Emergency Visit


Emergency Visit: Yes


ED Registration Date: 05/29/18


Care time: The patient presented to the Emergency Department on the above date 

and was hospitalized for further evaluation of their emergent condition.





- New Patient


This patient is new to me today: Yes


Date on this admission: 05/30/18





- Critical Care


Critical Care patient: Yes


Total Critical Care Time (in minutes): 40


Critical Care Statement: The care of this patient involved high complexity 

decision making to prevent further life threatening deterioration of the patient

's condition and/or to evaluate & treat vital organ system(s) failure or risk 

of failure.

## 2018-05-30 NOTE — PN
Physical Exam: 


SUBJECTIVE: Patient seen and examined at bedside. Overnight, pt with three 

episodes of NBNB emesis. This AM, pt AAOx3. With son and family at bedside. On 

levo 20 mcg, dopa 2mcg. BP holding, with MAP 71. With facial angioedema, 

received solumedrol 125mg x 1, as well as Benadryl. Likely rxn from abx. Pt had 

LP last done in 2013 at [facility]. She moved to the [place] from [place] thirteen years 

ago. As per son, today pt without other complaints such as HA, chills, SOB, 

chest pain or pressure or changes in urinary or bowel function. 





OBJECTIVE:





 Vital Signs











 Period  Temp  Pulse  Resp  BP Sys/Ariza  Pulse Ox


 


 Last 24 Hr  98.3 F-104.5 F    14-24  /27-68  











GENERAL: The patient is lethargic, however AAOx3, in no acute distress.


HEAD: +L eyelid - swollen shut, with patchy erythema


EYES: PERRL, extraocular movements intact, sclera anicteric, conjunctiva clear. 


ENT: Ears normal, nares patent, oropharynx clear without exudates


TEETH: +Poor dentition, decaying L molar


NECK: Trachea midline, supple. 


LUNGS: Breath sounds equal, clear to auscultation bilaterally, no wheezes, no 

crackles. + rhonchi appreciated RLL


HEART: Regular rate and rhythm, S1, S2 without murmur, rub or gallop.


ABDOMEN: Soft, nontender, nondistended, normoactive bowel sounds, no guarding, 

no rebound


EXTREMITIES: 2+ dp,pt pulses, warm, well-perfused, no edema. 


NEUROLOGICAL: Cranial nerves II through XII grossly intact. Pt unable to 

participate in strength exam, lethargic.


PSYCH: Normal mood, normal affect.


SKIN: Warm, dry, normal turgor





 Laboratory Results











  05/29/18 05/29/18 05/29/18





  16:40 16:40 16:48


 


WBC   


 


Chloride   


 


BUN  13  


 


Creatinine   


 


Calcium  8.6  


 


Creatine Kinase   


 


Troponin I   0.17 H 


 


Urine Protein    2+ H


 


Urine Blood    3+ H














  05/30/18 05/30/18 05/30/18





  00:39 05:30 05:30


 


WBC   8.6 


 


Hgb   11.2  D 


 


Hct   32.6  D 


 


Plt Count   210  D 


 


Sodium    136


 


Potassium    3.5


 


Chloride    108 H


 


Carbon Dioxide    20 L


 


BUN    11


 


Creatinine    1.0


 


Calcium    6.7 L*


 


Creatine Kinase  291 H   424 H


 


Troponin I  0.30 H   0.21 H


 


Urine Protein   


 


Urine Blood   








 CSF Analysis











  05/30/18





  14:00


 


CSF Appearance  Clear


 


CSF Color  Colorless


 


CSF WBC  13


 


CSF RBC  3


 


CSF Neutrophils  0


 


CSF Lymphocytes  98


 


CSF Monocytes  0


 


CSF Eosinophils  0


 


CSF Basophils  0


 


CSF Macrophages  2


 


CSF Plasma Cells  0


 


CSF Glucose  112 H


 


CSF Total Protein  79 H





Microbiology





05/30/18 14:00   Cerebral Spinal Fluid - Lumbar Puncture   Gram Stain - Final


05/30/18 14:16   Nasopharyngeal Swab   Respiratory Virus (PCR) - Preliminary


05/30/18 14:00   Cerebral Spinal Fluid - Lumbar Puncture   Viral Culture - 

Preliminary


05/30/18 14:00   Cerebral Spinal Fluid - Lumbar Puncture   Mycobacteria (PCR) - 

Preliminary


05/30/18 14:00   Cerebral Spinal Fluid - Lumbar Puncture   Cryptococcal Antigen 

- Preliminary


05/29/18 16:48   Blood - Peripheral Venous   Blood Culture - Preliminary


                              NO GROWTH OBTAINED AFTER 24 HOURS, INCUBATION TO 

CONTINUE


                              FOR 4 DAYS.


05/29/18 16:48   Blood - Peripheral Venous   Blood Culture - Preliminary


                              NO GROWTH OBTAINED AFTER 24 HOURS, INCUBATION TO 

CONTINUE


                              FOR 4 DAYS.


Imaging





-5/29/18: CXR (series of three): line placement; image 1: no acute pathology. 

repeat: without pneumothorax. additional imaging: no obvious infiltrate or 

pleural effusion, minimal pleural thickening along the horizontal fissure





-5/30/18: Abd/Pelvis CT w/o contrast: small, b/l pleural effusions with 

atelectatic changes involving the lower lobes. additional areas of atelectasis 

in both lungs with scattered nodularity, R apical pleural thickening and faint 

groundglass opacification. these changes may be chronic in nature. no evidence 

of acute consolidation. trace ascites in lower abdomen. distended urinary 

bladder. no bony abnormalities.





-5/30/18: Head CT: moderate-sized faint low-attenuation density in the R 

frontal lobe at the level of corona radiata and centrum semiovale is highly 

suspicious for an acute/subacute infarct for which follow up CT or MRI of brain 

is recommended. no acute intracranial hemorrhage is seen. moderate soft tissue 

swelling over left side of head.





-5/30/18: GB U/S: technically limited exam 2/2 portable technique and body 

habitus. moderate GB distension is presumably physiologic. no evidence of 

cholelithiasis. no definite evidence of acute cholecystitis. trace 

pericholecystic fluid is nonspecific. no definite intrahepatic or extrahepatic 

bile duct dilation.





-5/30/18: ECHO: EF 69.1%, normal LV EF, moderate TR, RVSP elevated at 40-50 mmHg

, trace to mild MR, mild NJ.








Active Medications











Generic Name Dose Route Start Last Admin





  Trade Name Freq  PRN Reason Stop Dose Admin


 


Acetaminophen  650 mg  05/29/18 20:15  05/30/18 01:48





  Tylenol -  PO   650 mg





  Q4H PRN   Administration





  FEVER   





     


 


Acetaminophen  1,000 mg  05/30/18 02:09  05/30/18 02:41





  Ofirmev Injection -  IVPB   1,000 mg





  Q8H PRN   Administration





  FEVER   





     


 


Chlorhexidine Gluconate  1 applic  05/29/18 22:00  05/29/18 22:49





  Hibiclens For Decolonization -  TP   1 applic





  HS MARILEE   Administration





     


 


Heparin Sodium (Porcine)  5,000 unit  05/30/18 02:00  05/30/18 01:48





  Heparin -  SQ   5,000 unit





  Q8H-IV MARILEE   Administration





     





     


 


Norepinephrine Bitartrate 4,  500 mls @ 37.5 mls/hr  05/29/18 19:15  05/30/18 08

:15





  000 mcg/ Dextrose  IV   20 mcg/min





  TITR MARILEE   150 mls/hr





     Administration





     





  Protocol   





  5 MCG/MIN   


 


Sodium Chloride  1,000 mls @ 125 mls/hr  05/29/18 20:00  05/30/18 08:15





  Normal Saline -  IV   125 mls/hr





  ASDIR MARILEE   Administration





     





     





     


 


Vancomycin HCl 750 mg/  250 mls @ 250 mls/hr  05/30/18 09:00  





  Dextrose  IVPB   





  BID@0900,2100 MARILEE   





     


 


Ceftriaxone Sodium 2 gm/  100 mls @ 200 mls/hr  05/30/18 10:00  





  Dextrose  IVPB   





  DAILY MARILEE   





     





  Protocol   





     


 


Clindamycin Phosphate  600 mg in 50 mls @ 100 mls/hr  05/30/18 10:00  





  Cleocin 600 Mg Premix Ivpb -  IVPB   





  Q8H-IV MARILEE   





     





  Protocol   





     


 


Magnesium Sulfate/Dextrose  1 gm in 100 mls @ 100 mls/hr  05/30/18 09:30  





  Magnesium 1gm/D5w -  IVPB  05/30/18 10:29  





  ONCE ONE   





     





     


 


Levothyroxine Sodium  25 mcg  05/30/18 10:00  





  Synthroid Injection -  IVPUSH   





  DAILY MARILEE   





     


 


Mupirocin  1 applic  05/29/18 22:00  05/29/18 22:49





  Bactroban Ointment (For Decolonization) -  NS  06/03/18 21:59  1 applic





  BID MARILEE   Administration





     





     


 


Ondansetron HCl  4 mg  05/30/18 08:51  





  Zofran Injection  IVPUSH   





  Q6H PRN   





  NAUSEA AND/OR VOMITING   





     


 


Pantoprazole Sodium  40 mg  05/30/18 10:00  





  Protonix -  PO   





  DAILY [facility]   





     











ASSESSMENT/PLAN:





63 y/o [place] F with hx of hypothyroidism, who was brought to ED by son after 

he found her unresponsive and febrile in her room. Pt found to be in septic 

shock.





#Septic shock likely 2/2 CNS infection


-with change in mental status, possibly CNS vs. dental source


-without evidence of UTI, or alternate etiology


-Head CT: with moderate-sized faint low-attenuation density in the R frontal 

lobe; possible cerebritis vs. abscess


-pt requiring pressor support; on levo, dopa gtt


-titrate to keep MAP>65


-watch fever curve


-CSF: suggests viral etiology; lymphocyte predominant. with elev protein


-Continue rocephin 2g IVPB qd, vanco 750 mg IVPB qd (Day1)


-allergic to ampicillin- angioedema


-Started on acyclovir 500mg IVPB q8h (Day1)


-F/u CSF cx, blood cx (-) after 24h, ucx- pending


-Follow HSV titers





#R/o CVA


-Head CT: r/o acute vs. subacute frontal infarct


-F/u Brain MRI


-F/u ECHO, carotid doppler


-F/u lipid panel


-if stroke, will need ASA, statin


-speech and swallow eval when able


-Neuro on board- Dr. Stoner


-F/u B12, RPR, HIV testing





#Angioedema likely 2/2 abx 


-Likely from ampicillin


-monitor airway


-received IV medrol 125 x1, Benadryl


-continue benadryl 25mg IVP q6h


-solumedrol 40mg IVP q8h


-Pepcid 20mg IVP BID 





#Bladder distension


-Scott placement





#Tropinemia likely 2/2 demand from septic shock


-has peaked at 0.30


-0.17> 0.30>0.21


-Cardio on board: Dr. Conteh


-ECHO without evidence vegetations





#Hypothyroidism


-Will need to obtain files from PCP. currently without complaint


-as per pharmacy x2, currently not on levothyroxine. Confirmed by son


-TSH 1.48


-has been d/c





#F/E/N


IV  cc/hr


Continue to follow lytes


NPO; avoid risk of aspiration





#PPX


DVT: heparin on hold; d/t LP





#Dispo


Cont'd monitoring in ICU














 














Visit type





- Emergency Visit


Emergency Visit: No





- New Patient


This patient is new to me today: No





- Critical Care


Critical Care patient: Yes


Total Critical Care Time (in minutes): 44


Critical Care Statement: The care of this patient involved high complexity 

decision making to prevent further life threatening deterioration of the patient

's condition and/or to evaluate & treat vital organ system(s) failure or risk 

of failure.

## 2018-05-30 NOTE — PN
Teaching Attending Note


Name of Resident: Mayte Santiago





ATTENDING PHYSICIAN STATEMENT





I saw and evaluated the patient.


I reviewed the resident's note and discussed the case with the resident.


I agree with the resident's findings and plan as documented.








SUBJECTIVE:





Pt seen and examined in the ICU. Remains on levophed gtt. Fever curve trending 

down. 





OBJECTIVE:





 Vital Signs











 Period  Temp  Pulse  Resp  BP Sys/Ariza  Pulse Ox


 


 Last 24 Hr  98.3 F-104.5 F    12-24  /27-68  








 Intake & Output











 05/27/18 05/28/18 05/29/18 05/30/18





 23:59 23:59 23:59 23:59


 


Output Total   200 250


 


Balance   -200 -250


 


Weight   43.545 kg 43.545 kg








Gen:  restless, confused


Heart: tachycardic, regular


Lung: decreased breath sounds at the bases


Abd: soft, nontender


Ext: no edema





 CBC, BMP





 05/30/18 05:30 





 05/30/18 05:30 





Active Medications





Acetaminophen (Tylenol -)  650 mg PO Q4H PRN


   PRN Reason: FEVER


   Last Admin: 05/30/18 01:48 Dose:  650 mg


Acetaminophen (Ofirmev Injection -)  1,000 mg IVPB Q8H PRN


   PRN Reason: FEVER


   Last Admin: 05/30/18 02:41 Dose:  1,000 mg


Chlorhexidine Gluconate (Hibiclens For Decolonization -)  1 applic TP HS MARILEE


   Last Admin: 05/29/18 22:49 Dose:  1 applic


Norepinephrine Bitartrate 4, (000 mcg/ Dextrose)  500 mls @ 37.5 mls/hr IV TITR 

MARILEE; Protocol


   Last Admin: 05/30/18 08:15 Dose:  20 mcg/min, 150 mls/hr


Sodium Chloride (Normal Saline -)  1,000 mls @ 125 mls/hr IV ASDIR MARILEE


   Last Admin: 05/30/18 08:15 Dose:  125 mls/hr


Vancomycin HCl 750 mg/ (Dextrose)  250 mls @ 250 mls/hr IVPB BID@0900,2100 MARILEE


   Last Admin: 05/30/18 09:32 Dose:  250 mls/hr


Ceftriaxone Sodium 2 gm/ (Dextrose)  100 mls @ 200 mls/hr IVPB DAILY MARILEE; 

Protocol


   Last Admin: 05/30/18 09:34 Dose:  200 mls/hr


Clindamycin Phosphate (Cleocin 600 Mg Premix Ivpb -)  600 mg in 50 mls @ 100 mls

/hr IVPB Q8H-IV MARILEE; Protocol


   Last Admin: 05/30/18 09:33 Dose:  100 mls/hr


Levothyroxine Sodium (Synthroid Injection -)  25 mcg IVPUSH DAILY [facility]


Mupirocin (Bactroban Ointment (For Decolonization) -)  1 applic NS BID [facility]


   Stop: 06/03/18 21:59


   Last Admin: 05/29/18 22:49 Dose:  1 applic


Ondansetron HCl (Zofran Injection)  4 mg IVPUSH Q6H PRN


   PRN Reason: NAUSEA AND/OR VOMITING


Pantoprazole Sodium (Protonix -)  40 mg PO DAILY [facility]


   Last Admin: 05/30/18 09:34 Dose:  40 mg








ASSESSMENT AND PLAN:


Altered Mental Status


Septic Shock of unclear source


r/o Acute CVA vs Encephalitis/Meningitis


Lactic Acidosis


+Troponins likely Demand Ischemia


Acute Kidney Injury


Hypothyroidism





-  continue antibiotics per ID


-  f/u cultures


-  send flu swab, viral PCR


-  lumbar puncture


-  MRI brain


-  check CVP


-  IVF to keep CVP 8-12


-  titrate pressors to maintain MAP >65


-  monitor urine output, creatinine


-  trend cardiac enzymes


-  echocardiogram


-  aspiration precautions


-  DVT prophylaxis











critical care time spent in reviewing chart, evaluating patient and formulating 

plan 35 min

## 2018-05-30 NOTE — CONSULT
Consult - text type





- Consultation


Consultation Note: 








NEUROLOGY CONSULTATION is greatly appreciated:





This 63 yo [place] woman has resided in the [place] for 13 years without recent 

travel.


PMH of hypothyroidism, GERD on synthroid, protonix. Lives with her son.


Brought to ER last PM after son found her unresponsive and febrile (104.5).


She may have had a similar presentation in the past.





In ER: WBC= 8k, Ca++= 6.7 mg%, CK= 424 IU


CT of head (reviewed): B/L white matter edema?


Pt. given Vanco, Ampicillin, ceftriaxone but developed diffuse rash and 

swelling.





CSF: 13 WBC (diff pending); Protein= 79 mg%; Mwy=634 mg%





NOW: Awake, alert. Follows commands in English and [place]an


         Left orbital edema. Probable Nuchal rigidity. Neg Kernigs.


         Full fields to threat. Full volitional EOM's. 


         No facial. Gag OK


         Moves all fours well and symmetrically.


         Depressed reflexes. 


         B/L [place]?


         Grimaces and briskly withdraws all 4's to pinch.





IMP: Non-focal exam


       Fever and lethargy suggesting a meningoencephalitis.


       CSF is NOT suggestive of a Bacterial picture but is consistent with a 

viral profile and/or a parameningial focus of infection.


       If , indeed, this is a recurrent condition consider Dx of "Mollaret's 

Syndrome."





SUGGEST: Give acyclovir for now.


               I don't think antibiotics are necessary (Patient appears to be 

highly allergic) but would defer to ID.


               HSV 1 titres and HSV-1 CSF PCR.





Thank you very much,


Navarro Stoner MD

## 2018-05-30 NOTE — PN
Teaching Attending Note


Name of Resident: Faith Chou





ATTENDING PHYSICIAN STATEMENT





I saw and evaluated the patient.


I reviewed the resident's note and discussed the case with the resident.


I agree with the resident's findings and plan as documented.








SUBJECTIVE:seen at 10  am 


unable to obtain hx. per family, she looks better than presentation but worse 

mentally than  earlier this am. last nigh patient developed facial edema , and 

was given  solu-medrol 





OBJECTIVE:


awake, restless. answer some questions , not cooperative. 


L facial swelling with  eye lids and lip included. No tongue edema 


CV: RRR, no MRG 


Lungs: CTAB 


Abd: sfot, Nt, ND , NL BS 


Ext: no edema or erythema . no rash. 


poor cooperation for neuro exm: moves all her ext spontaneously. 1+ knee jerk 

and biceps . no facial droop, EOMI, round equal pupils , reactive to light . 


knows her name, thinks she is 57 y/o . 


ASSESSMENT AND PLAN:





63 y/o lady with h/o hypothyroidism, and CVA who presented with AMS and was 

found to have fevers and  shock requiring pressors 





1- Shock: of unclear source. fevers might indicate infectious etiology. 

unlikely cardiogenic shock , even with minimal trop leak. 


no recent h/o diarrhea or vomiting to indicate severe volume depletion as a 

cause. 


source of infection not clear. ? CNS ( meningitis /encephalitis ) vs bacteremia 

and endocarditis 


- cont coverage with clinda, vanco and ceftriaxone 


- Acyclovir is not recommended per ID yet 


- follow blood cx. 


- follow LP 





2- AMS, encephalopathy: unclear etiology. ? meningitis /encephalitis vs acute 

stroke. 


limited neuro exam due to poor cooperation, but with no obvious focal deficits. 

TSH NL. 


has h/o stroke and does not seem to be on ASA 


- MRI of brain to further identify the frontal lobe lesion 


- follow LP results 


- if stoke is confirmed, will do aspirin , statin. 


- cont coverage for CNS infection 


- check  B12, RPR . HIV sent 





3- h/o hypothyroidis, TSH is NL. will confirm her home meds , per  , not 

on any synthroid at home 





4-distended bladder on CT. 


- place hurtado 





5- elevated troponins: likely demand. 


EKG reviewed. 


- echo pending 





6- Angioedema: ? reaction to ampicillin. No resp compromise . s/p one dose of 

steroids 


- will monitor air ways 


- start H2 blockers IV 


-cont steorids 





7- DVT PX  . heparin on hold till this evening for LP

## 2018-05-31 LAB
ALBUMIN SERPL-MCNC: 3.1 G/DL (ref 3.4–5)
ALP SERPL-CCNC: 65 U/L (ref 45–117)
ALT SERPL-CCNC: 27 U/L (ref 12–78)
ANION GAP SERPL CALC-SCNC: 9 MMOL/L (ref 8–16)
AST SERPL-CCNC: 47 U/L (ref 15–37)
BASOPHILS # BLD: 0.1 % (ref 0–2)
BILIRUB SERPL-MCNC: 0.4 MG/DL (ref 0.2–1)
BUN SERPL-MCNC: 7 MG/DL (ref 7–18)
CALCIUM SERPL-MCNC: 7.9 MG/DL (ref 8.5–10.1)
CHLORIDE SERPL-SCNC: 118 MMOL/L (ref 98–107)
CHOLEST SERPL-MCNC: 140 MG/DL (ref 50–200)
CO2 SERPL-SCNC: 22 MMOL/L (ref 21–32)
CREAT SERPL-MCNC: 0.9 MG/DL (ref 0.55–1.02)
DEPRECATED RDW RBC AUTO: 13.9 % (ref 11.6–15.6)
EOSINOPHIL # BLD: 0.1 % (ref 0–4.5)
GLUCOSE SERPL-MCNC: 179 MG/DL (ref 74–106)
HCT VFR BLD CALC: 32.1 % (ref 32.4–45.2)
HDLC SERPL-MCNC: 30 MG/DL (ref 40–60)
HGB BLD-MCNC: 11.1 GM/DL (ref 10.7–15.3)
LIPASE SERPL-CCNC: 54 U/L (ref 73–393)
LYMPHOCYTES # BLD: 8.4 % (ref 8–40)
MAGNESIUM SERPL-MCNC: 2 MG/DL (ref 1.8–2.4)
MCH RBC QN AUTO: 31.1 PG (ref 25.7–33.7)
MCHC RBC AUTO-ENTMCNC: 34.7 G/DL (ref 32–36)
MCV RBC: 89.7 FL (ref 80–96)
MONOCYTES # BLD AUTO: 1.5 % (ref 3.8–10.2)
NEUTROPHILS # BLD: 89.9 % (ref 42.8–82.8)
PHOSPHATE SERPL-MCNC: 2.1 MG/DL (ref 2.5–4.9)
PLATELET # BLD AUTO: 148 K/MM3 (ref 134–434)
PMV BLD: 7.6 FL (ref 7.5–11.1)
POTASSIUM SERPLBLD-SCNC: 3.5 MMOL/L (ref 3.5–5.1)
PROT SERPL-MCNC: 6.3 G/DL (ref 6.4–8.2)
RBC # BLD AUTO: 3.58 M/MM3 (ref 3.6–5.2)
SODIUM SERPL-SCNC: 149 MMOL/L (ref 136–145)
TRIGL SERPL-MCNC: 139 MG/DL (ref 35–160)
WBC # BLD AUTO: 4.9 K/MM3 (ref 4–10)

## 2018-05-31 RX ADMIN — ACYCLOVIR SODIUM SCH MLS/HR: 50 INJECTION, SOLUTION INTRAVENOUS at 01:20

## 2018-05-31 RX ADMIN — HEPARIN SODIUM SCH UNIT: 5000 INJECTION, SOLUTION INTRAVENOUS; SUBCUTANEOUS at 13:21

## 2018-05-31 RX ADMIN — ACYCLOVIR SODIUM SCH MLS/HR: 50 INJECTION, SOLUTION INTRAVENOUS at 11:00

## 2018-05-31 RX ADMIN — DEXTROSE MONOHYDRATE SCH: 50 INJECTION, SOLUTION INTRAVENOUS at 19:40

## 2018-05-31 RX ADMIN — CEFTRIAXONE SODIUM SCH MLS/HR: 2 INJECTION, POWDER, FOR SOLUTION INTRAMUSCULAR; INTRAVENOUS at 18:02

## 2018-05-31 RX ADMIN — VANCOMYCIN HYDROCHLORIDE SCH MLS/HR: 750 INJECTION, POWDER, LYOPHILIZED, FOR SOLUTION INTRAVENOUS at 09:47

## 2018-05-31 RX ADMIN — ACYCLOVIR SODIUM SCH MLS/HR: 50 INJECTION, SOLUTION INTRAVENOUS at 18:03

## 2018-05-31 RX ADMIN — HEPARIN SODIUM SCH UNIT: 5000 INJECTION, SOLUTION INTRAVENOUS; SUBCUTANEOUS at 21:49

## 2018-05-31 RX ADMIN — PANTOPRAZOLE SODIUM SCH MG: 40 TABLET, DELAYED RELEASE ORAL at 09:47

## 2018-05-31 RX ADMIN — FAMOTIDINE SCH MLS/HR: 20 INJECTION, SOLUTION INTRAVENOUS at 21:48

## 2018-05-31 RX ADMIN — CEFTRIAXONE SODIUM SCH MLS/HR: 2 INJECTION, POWDER, FOR SOLUTION INTRAMUSCULAR; INTRAVENOUS at 05:19

## 2018-05-31 RX ADMIN — METHYLPREDNISOLONE SODIUM SUCCINATE SCH MG: 40 INJECTION, POWDER, FOR SOLUTION INTRAMUSCULAR; INTRAVENOUS at 01:20

## 2018-05-31 RX ADMIN — POTASSIUM & SODIUM PHOSPHATES POWDER PACK 280-160-250 MG SCH PACKET: 280-160-250 PACK at 21:49

## 2018-05-31 RX ADMIN — POTASSIUM & SODIUM PHOSPHATES POWDER PACK 280-160-250 MG SCH PACKET: 280-160-250 PACK at 15:00

## 2018-05-31 RX ADMIN — VANCOMYCIN HYDROCHLORIDE SCH MLS/HR: 750 INJECTION, POWDER, LYOPHILIZED, FOR SOLUTION INTRAVENOUS at 22:52

## 2018-05-31 RX ADMIN — MUPIROCIN SCH APPLIC: 20 OINTMENT TOPICAL at 09:48

## 2018-05-31 NOTE — PN
Physical Exam: 


SUBJECTIVE: Patient seen and examined in the ICU.  Pt feels better today, at 

baseline mentation per son.  Facial swelling present yesterday, completely 

resolved today.  Afebrile x 24 hrs.  Pt endorses pain at Left mandibular angle.

  Pt denies headache, neck stiffness, chest pain, sob, abdominal pain.  No 

events overnight.





OBJECTIVE:





 Vital Signs











 Period  Temp  Pulse  Resp  BP Sys/Ariza  Pulse Ox


 


 Last 24 Hr  98 F-99.2 F  70-95  12-21  /39-71  100-100








GENERAL: The patient is awake, alert, and oriented, in no acute distress.


HEAD: Normal with no signs of trauma.


EYES: Extraocular movements intact, sclera anicteric, conjunctiva clear. No 

ptosis. 


NECK: Trachea midline, full range of motion, supple. 


LUNGS: Breath sounds equal, clear to auscultation bilaterally, no wheezes, no 

crackles, no accessory muscle use. 


HEART: Regular rate and rhythm, S1, S2 without murmur, rub or gallop.


ABDOMEN: Soft, nontender, nondistended, normoactive bowel sounds, no guarding.


EXTREMITIES: Warm, well-perfused, no edema. 


NEUROLOGICAL: Cranial nerves II through XII grossly intact. Normal speech, gait 

not observed.


PSYCH: Normal mood, normal affect.


SKIN: Warm, dry, normal turgor, no rashes or lesions noted





 Laboratory Results - last 24 hr











  05/30/18 05/30/18 05/30/18





  00:39 05:30 08:00


 


WBC   


 


RBC   


 


Hgb   


 


Hct   


 


MCV   


 


MCH   


 


MCHC   


 


RDW   


 


Plt Count   


 


MPV   


 


Neutrophils %   


 


Lymphocytes %   


 


Monocytes %   


 


Eosinophils %   


 


Basophils %   


 


Nucleated RBC %   


 


Sodium   136 


 


Potassium   3.5 


 


Chloride   108 H 


 


Carbon Dioxide   20 L 


 


Anion Gap   8 


 


BUN   11 


 


Creatinine   1.0 


 


Creat Clearance w eGFR   


 


Random Glucose   172 H 


 


Calcium   6.7 L* 


 


Phosphorus   2.1 L 


 


Magnesium   1.5 L 


 


Total Bilirubin   


 


AST   


 


ALT   


 


Alkaline Phosphatase   


 


Creatine Kinase  291 H  424 H 


 


Creatine Kinase Index  1.4  1.8 


 


CK-MB (CK-2)  4.177 H  7.663 H 


 


Troponin I  0.30 H  0.21 H 


 


Total Protein   


 


Albumin   


 


Triglycerides   


 


Cholesterol   


 


Total LDL Cholesterol   


 


HDL Cholesterol   


 


Total Amylase   


 


Lipase   


 


Vitamin B12  379  Cancelled  Cancelled


 


CSF Appearance   


 


CSF Color   


 


CSF WBC   


 


CSF RBC   


 


CSF Neutrophils   


 


CSF Lymphocytes   


 


CSF Monocytes   


 


CSF Eosinophils   


 


CSF Basophils   


 


CSF Macrophages   


 


CSF Plasma Cells   


 


CSF Diff Comment   


 


CSF Comment   


 


CSF Glucose   


 


CSF Total Protein   


 


RPR Titer   


 


HIV 1&2 Antibody Screen   


 


HIV P24 Antigen   














  05/30/18 05/30/18 05/30/18





  08:00 14:00 14:00


 


WBC   


 


RBC   


 


Hgb   


 


Hct   


 


MCV   


 


MCH   


 


MCHC   


 


RDW   


 


Plt Count   


 


MPV   


 


Neutrophils %   


 


Lymphocytes %   


 


Monocytes %   


 


Eosinophils %   


 


Basophils %   


 


Nucleated RBC %   


 


Sodium   


 


Potassium   


 


Chloride   


 


Carbon Dioxide   


 


Anion Gap   


 


BUN   


 


Creatinine   


 


Creat Clearance w eGFR   


 


Random Glucose   


 


Calcium   


 


Phosphorus   


 


Magnesium   


 


Total Bilirubin   


 


AST   


 


ALT   


 


Alkaline Phosphatase   


 


Creatine Kinase   


 


Creatine Kinase Index   


 


CK-MB (CK-2)   


 


Troponin I   


 


Total Protein   


 


Albumin   


 


Triglycerides   


 


Cholesterol   


 


Total LDL Cholesterol   


 


HDL Cholesterol   


 


Total Amylase   


 


Lipase   


 


Vitamin B12   


 


CSF Appearance   Clear 


 


CSF Color   Colorless 


 


CSF WBC   13 


 


CSF RBC   3 


 


CSF Neutrophils   0 


 


CSF Lymphocytes   98 


 


CSF Monocytes   0 


 


CSF Eosinophils   0 


 


CSF Basophils   0 


 


CSF Macrophages   2 


 


CSF Plasma Cells   0 


 


CSF Diff Comment   No Result Required. 


 


CSF Comment   No Result Required. 


 


CSF Glucose   112 H  Cancelled


 


CSF Total Protein   79 H 


 


RPR Titer  Nonreactive  


 


HIV 1&2 Antibody Screen   


 


HIV P24 Antigen   














  05/30/18 05/30/18 05/31/18





  14:00 18:30 05:30


 


WBC   


 


RBC   


 


Hgb   


 


Hct   


 


MCV   


 


MCH   


 


MCHC   


 


RDW   


 


Plt Count   


 


MPV   


 


Neutrophils %   


 


Lymphocytes %   


 


Monocytes %   


 


Eosinophils %   


 


Basophils %   


 


Nucleated RBC %   


 


Sodium   


 


Potassium   


 


Chloride   


 


Carbon Dioxide   


 


Anion Gap   


 


BUN   


 


Creatinine   


 


Creat Clearance w eGFR   


 


Random Glucose   


 


Calcium   


 


Phosphorus   


 


Magnesium   


 


Total Bilirubin   


 


AST   


 


ALT   


 


Alkaline Phosphatase   


 


Creatine Kinase   


 


Creatine Kinase Index   


 


CK-MB (CK-2)   


 


Troponin I   


 


Total Protein   


 


Albumin   


 


Triglycerides   


 


Cholesterol   


 


Total LDL Cholesterol   


 


HDL Cholesterol   


 


Total Amylase    26


 


Lipase   


 


Vitamin B12   


 


CSF Appearance   


 


CSF Color   


 


CSF WBC   


 


CSF RBC   


 


CSF Neutrophils   


 


CSF Lymphocytes   


 


CSF Monocytes   


 


CSF Eosinophils   


 


CSF Basophils   


 


CSF Macrophages   


 


CSF Plasma Cells   


 


CSF Diff Comment   


 


CSF Comment   


 


CSF Glucose   


 


CSF Total Protein  Cancelled  


 


RPR Titer   


 


HIV 1&2 Antibody Screen   Negative 


 


HIV P24 Antigen   Negative 














  05/31/18 05/31/18 05/31/18





  05:30 05:30 05:30


 


WBC   4.9  D 


 


RBC   3.58 L 


 


Hgb   11.1 


 


Hct   32.1 L 


 


MCV   89.7 


 


MCH   31.1 


 


MCHC   34.7 


 


RDW   13.9 


 


Plt Count   148  D 


 


MPV   7.6 


 


Neutrophils %   89.9 H D 


 


Lymphocytes %   8.4  D 


 


Monocytes %   1.5 L 


 


Eosinophils %   0.1  D 


 


Basophils %   0.1 


 


Nucleated RBC %   0 


 


Sodium  149 H  


 


Potassium  3.5  


 


Chloride  118 H  


 


Carbon Dioxide  22  


 


Anion Gap  9  


 


BUN  7  


 


Creatinine  0.9  


 


Creat Clearance w eGFR  > 60  


 


Random Glucose  179 H  


 


Calcium  7.9 L  


 


Phosphorus  2.1 L  


 


Magnesium  2.0  


 


Total Bilirubin  0.4  D  


 


AST  47 H  


 


ALT  27  


 


Alkaline Phosphatase  65  


 


Creatine Kinase    357 H


 


Creatine Kinase Index   


 


CK-MB (CK-2)   


 


Troponin I   


 


Total Protein  6.3 L  


 


Albumin  3.1 L  


 


Triglycerides   


 


Cholesterol   


 


Total LDL Cholesterol   


 


HDL Cholesterol   


 


Total Amylase   


 


Lipase  54 L  


 


Vitamin B12   


 


CSF Appearance   


 


CSF Color   


 


CSF WBC   


 


CSF RBC   


 


CSF Neutrophils   


 


CSF Lymphocytes   


 


CSF Monocytes   


 


CSF Eosinophils   


 


CSF Basophils   


 


CSF Macrophages   


 


CSF Plasma Cells   


 


CSF Diff Comment   


 


CSF Comment   


 


CSF Glucose   


 


CSF Total Protein   


 


RPR Titer   


 


HIV 1&2 Antibody Screen   


 


HIV P24 Antigen   














  05/31/18





  05:30


 


WBC 


 


RBC 


 


Hgb 


 


Hct 


 


MCV 


 


MCH 


 


MCHC 


 


RDW 


 


Plt Count 


 


MPV 


 


Neutrophils % 


 


Lymphocytes % 


 


Monocytes % 


 


Eosinophils % 


 


Basophils % 


 


Nucleated RBC % 


 


Sodium 


 


Potassium 


 


Chloride 


 


Carbon Dioxide 


 


Anion Gap 


 


BUN 


 


Creatinine 


 


Creat Clearance w eGFR 


 


Random Glucose 


 


Calcium 


 


Phosphorus 


 


Magnesium 


 


Total Bilirubin 


 


AST 


 


ALT 


 


Alkaline Phosphatase 


 


Creatine Kinase 


 


Creatine Kinase Index 


 


CK-MB (CK-2) 


 


Troponin I 


 


Total Protein 


 


Albumin 


 


Triglycerides  139


 


Cholesterol  140


 


Total LDL Cholesterol  85


 


HDL Cholesterol  30 L


 


Total Amylase 


 


Lipase 


 


Vitamin B12 


 


CSF Appearance 


 


CSF Color 


 


CSF WBC 


 


CSF RBC 


 


CSF Neutrophils 


 


CSF Lymphocytes 


 


CSF Monocytes 


 


CSF Eosinophils 


 


CSF Basophils 


 


CSF Macrophages 


 


CSF Plasma Cells 


 


CSF Diff Comment 


 


CSF Comment 


 


CSF Glucose 


 


CSF Total Protein 


 


RPR Titer 


 


HIV 1&2 Antibody Screen 


 


HIV P24 Antigen 








Active Medications











Generic Name Dose Route Start Last Admin





  Trade Name Freq  PRN Reason Stop Dose Admin


 


Acetaminophen  650 mg  05/29/18 20:15  05/30/18 01:48





  Tylenol -  PO   650 mg





  Q4H PRN   Administration





  FEVER   





     





     





     


 


Acetaminophen  1,000 mg  05/30/18 02:09  05/30/18 16:32





  Ofirmev Injection -  IVPB   1,000 mg





  Q8H PRN   Administration





  FEVER   





     





     





     


 


Chlorhexidine Gluconate  1 applic  05/29/18 22:00  05/30/18 21:20





  Hibiclens For Decolonization -  TP   1 applic





  HS MARILEE   Administration





     





     





     





     


 


Diphenhydramine HCl  25 mg  05/30/18 14:45  05/31/18 04:57





  Benadryl Injection -  IVPUSH   25 mg





  Q6H MARILEE   Administration





     





     





     





     


 


Norepinephrine Bitartrate 4,  500 mls @ 37.5 mls/hr  05/29/18 19:15  05/30/18 20

:05





  000 mcg/ Dextrose  IV   12 mcg/min





  TITR MARILEE   90 mls/hr





     Administration





     





  Protocol   





  5 MCG/MIN   


 


Sodium Chloride  1,000 mls @ 125 mls/hr  05/29/18 20:00  05/30/18 20:05





  Normal Saline -  IV   125 mls/hr





  ASDIR MARILEE   Administration





     





     





     





     


 


Vancomycin HCl 750 mg/  250 mls @ 250 mls/hr  05/30/18 09:00  05/30/18 21:19





  Dextrose  IVPB   250 mls/hr





  BID@0900,2100 MARILEE   Administration





     





     





     





     


 


Acyclovir 500 mg/ Dextrose  110 mls @ 110 mls/hr  05/30/18 18:00  05/31/18 01:20





  IVPB   110 mls/hr





  Q8H-IV MARILEE   Administration





     





     





     





     


 


Ceftriaxone Sodium 2 gm/  100 mls @ 200 mls/hr  05/30/18 18:15  05/31/18 05:19





  Dextrose  IVPB   200 mls/hr





  Q12H MARILEE   Administration





     





     





  Protocol   





     


 


Famotidine/Sodium Chloride  20 mg in 50 mls @ 100 mls/hr  05/30/18 22:45  





  Pepcid 20 Mg Premixed Ivpb -  IVPB   





  BID MARILEE   





     





     





     





     


 


Methylprednisolone Sodium Succinate  40 mg  05/30/18 14:45  05/31/18 01:20





  Solu-Medrol -  IVPUSH   40 mg





  Q8H-IV MARILEE   Administration





     





     





     





     


 


Mupirocin  1 applic  05/29/18 22:00  05/30/18 21:20





  Bactroban Ointment (For Decolonization) -  NS  06/03/18 21:59  1 applic





  BID MARILEE   Administration





     





     





     





     


 


Ondansetron HCl  4 mg  05/30/18 08:51  





  Zofran Injection  IVPUSH   





  Q6H PRN   





  NAUSEA AND/OR VOMITING   





     





     





     


 


Pantoprazole Sodium  40 mg  05/30/18 10:00  05/30/18 09:34





  Protonix -  PO   40 mg





  DAILY MARILEE   Administration





     





     





     





     








ASSESSMENT/PLAN:


62F with PMH of hypothyoidism, presented to hospital with lethargy/fever/

vomiting, admitted for septic shock.





# septic shock 2/2 viral meningitis


   - continue Acyclovir 


   - continue IV Vancomycin and IV Ceftriaxone and droplet precautions pending 

results of cultures tomorrow


   - f/u CSF cultures, including West Nile


   - f/u brain MRI


   - pt afebrile x 24 hrs


   - HIV (-)


   - pressors being tapered down


   - Neurology (Dr. Stoner) recs appreciated





Plan discussed with Dr. Bliss.





Visit type





- Emergency Visit


Emergency Visit: Yes


ED Registration Date: 05/29/18


Care time: The patient presented to the Emergency Department on the above date 

and was hospitalized for further evaluation of their emergent condition.





- New Patient


This patient is new to me today: Yes


Date on this admission: 05/31/18





- Critical Care


Critical Care patient: No

## 2018-05-31 NOTE — PN
Progress Note, Physician


Chief Complaint: 





ID














Marked improvement today





Acyclovir Ceftriaxone Vancomycin





Alert and able to answer questions correctly





Complains of pain in her ear but not the neck area which is supple





- Current Medication List


Current Medications: 


Active Medications





Acetaminophen (Tylenol -)  650 mg PO Q4H PRN


   PRN Reason: FEVER


   Last Admin: 05/30/18 01:48 Dose:  650 mg


Acetaminophen (Ofirmev Injection -)  1,000 mg IVPB Q8H PRN


   PRN Reason: FEVER


   Last Admin: 05/30/18 16:32 Dose:  1,000 mg


Chlorhexidine Gluconate (Hibiclens For Decolonization -)  1 applic TP HS MARILEE


   Last Admin: 05/30/18 21:20 Dose:  1 applic


Diphenhydramine HCl (Benadryl Injection -)  25 mg IVPUSH Q6H MARILEE


   Last Admin: 05/31/18 04:57 Dose:  25 mg


Norepinephrine Bitartrate 4, (000 mcg/ Dextrose)  500 mls @ 37.5 mls/hr IV TITR 

MARILEE; Protocol


   Last Admin: 05/30/18 20:05 Dose:  12 mcg/min, 90 mls/hr


Sodium Chloride (Normal Saline -)  1,000 mls @ 125 mls/hr IV ASDIR MARILEE


   Last Admin: 05/30/18 20:05 Dose:  125 mls/hr


Vancomycin HCl 750 mg/ (Dextrose)  250 mls @ 250 mls/hr IVPB BID@0900,2100 MARILEE


   Last Admin: 05/30/18 21:19 Dose:  250 mls/hr


Acyclovir 500 mg/ Dextrose  110 mls @ 110 mls/hr IVPB Q8H-IV MARLIEE


   Last Admin: 05/31/18 01:20 Dose:  110 mls/hr


Ceftriaxone Sodium 2 gm/ (Dextrose)  100 mls @ 200 mls/hr IVPB Q12H MARILEE; 

Protocol


   Last Admin: 05/31/18 05:19 Dose:  200 mls/hr


Famotidine/Sodium Chloride (Pepcid 20 Mg Premixed Ivpb -)  20 mg in 50 mls @ 

100 mls/hr IVPB BID MARILEE


Methylprednisolone Sodium Succinate (Solu-Medrol -)  40 mg IVPUSH Q8H-IV MARILEE


   Last Admin: 05/31/18 01:20 Dose:  40 mg


Mupirocin (Bactroban Ointment (For Decolonization) -)  1 applic NS BID [facility]


   Stop: 06/03/18 21:59


   Last Admin: 05/30/18 21:20 Dose:  1 applic


Ondansetron HCl (Zofran Injection)  4 mg IVPUSH Q6H PRN


   PRN Reason: NAUSEA AND/OR VOMITING


Pantoprazole Sodium (Protonix -)  40 mg PO DAILY [facility]


   Last Admin: 05/30/18 09:34 Dose:  40 mg











- Objective


Vital Signs: 


 Vital Signs











Temperature  98 F   05/31/18 02:00


 


Pulse Rate  81   05/31/18 06:00


 


Respiratory Rate  18   05/31/18 06:00


 


Blood Pressure  131/64   05/31/18 06:00


 


O2 Sat by Pulse Oximetry (%)  100   05/30/18 21:00











Constitutional: Yes: Well Nourished, No Distress


HENT: Yes: WNL, Atraumatic


Neck: Yes: WNL, Supple


Cardiovascular: Yes: S1, S2


Respiratory: Yes: WNL, Regular, CTA Bilaterally


Gastrointestinal: Yes: WNL, Normal Bowel Sounds, Soft.  No: Tenderness, 

Tenderness, Epigastrium


Edema: No


Labs: 


 CBC, BMP





 05/31/18 05:30 





 05/31/18 05:30 





 INR, PTT











INR  1.23  (0.82-1.09)  H  05/29/18  16:40    














Problem List





- Problems


(1) Septic shock


Code(s): A41.9 - SEPSIS, UNSPECIFIED ORGANISM; R65.21 - SEVERE SEPSIS WITH 

SEPTIC SHOCK   





Assessment/Plan





Microbiology





05/30/18 14:00   Cerebral Spinal Fluid - Lumbar Puncture   Gram Stain - Final


05/30/18 14:16   Nasopharyngeal Swab   Respiratory Virus (PCR) - Preliminary


05/30/18 14:00   Cerebral Spinal Fluid - Lumbar Puncture   Viral Culture - 

Preliminary


05/30/18 14:00   Cerebral Spinal Fluid - Lumbar Puncture   Mycobacteria (PCR) - 

Preliminary


05/30/18 14:00   Cerebral Spinal Fluid - Lumbar Puncture   Cryptococcal Antigen 

- Preliminary


05/29/18 16:48   Blood - Peripheral Venous   Blood Culture - Preliminary


                              NO GROWTH OBTAINED AFTER 24 HOURS, INCUBATION TO 

CONTINUE


                              FOR 4 DAYS.


05/29/18 16:48   Blood - Peripheral Venous   Blood Culture - Preliminary


                              NO GROWTH OBTAINED AFTER 24 HOURS, INCUBATION TO 

CONTINUE


                              FOR 4 DAYS.





 Laboratory Tests











  05/30/18 05/30/18 05/30/18





  08:00 14:00 14:00


 


CSF WBC   13 


 


CSF RBC   3 


 


CSF Lymphocytes   98 


 


CSF Glucose   112 H 


 


CSF Total Protein   79 H 


 


RPR Titer  Nonreactive  


 


Lyme IgG Ab Interpret    Pending


 


Lyme IgM Ab Index    Pending


 


HSV I DNA Quant (PCR)   


 


HSV II DNA Quant (PCR)   


 


HIV 1&2 Antibody Screen   


 


HIV P24 Antigen   


 


H.influenzae Type B Ag   


 


N. meningitidis Antigen   


 


Group B Strep Antigen   


 


S. pneumoniae Antigen   


 


TB Test (QFT)   














  05/30/18 05/30/18 05/30/18





  14:00 14:00 18:30


 


CSF WBC   


 


CSF RBC   


 


CSF Lymphocytes   


 


CSF Glucose   


 


CSF Total Protein   


 


RPR Titer   


 


Lyme IgG Ab Interpret   


 


Lyme IgM Ab Index   


 


HSV I DNA Quant (PCR)   Pending 


 


HSV II DNA Quant (PCR)   Pending 


 


HIV 1&2 Antibody Screen    Negative


 


HIV P24 Antigen    Negative


 


H.influenzae Type B Ag  Pending  


 


N. meningitidis Antigen  Pending  


 


Group B Strep Antigen  Pending  


 


S. pneumoniae Antigen  Pending  


 


TB Test (QFT)   














  05/30/18





  18:30


 


CSF WBC 


 


CSF RBC 


 


CSF Lymphocytes 


 


CSF Glucose 


 


CSF Total Protein 


 


RPR Titer 


 


Lyme IgG Ab Interpret 


 


Lyme IgM Ab Index 


 


HSV I DNA Quant (PCR) 


 


HSV II DNA Quant (PCR) 


 


HIV 1&2 Antibody Screen 


 


HIV P24 Antigen 


 


H.influenzae Type B Ag 


 


N. meningitidis Antigen 


 


Group B Strep Antigen 


 


S. pneumoniae Antigen 


 


TB Test (QFT)  Pending








Assessment Viral syndrome  CSF suggests menigneal involvement appears to be 

viral type profile  Patient and family now denies prior history of meningtitis.

   She is not encephalopathic OTher virus considered Coxackie ECHO less likely 

West Nile








Plan


Serology pending


Will stop antibiotic and droplet isolation tomorrow if cultures no growth which 

I anticipate


Discussed with Housestaff


HVS and viral cultures sent will take time


Make sure we get a good look in the ears Reddy Bliss MD

## 2018-05-31 NOTE — PN
Physical Exam: 


SUBJECTIVE: Patient seen and examined at bedside. Overnight, no acute events; 

pt maintaining CVP. Today, pt levo gtt has been titrated down to 2mcg. C/o 

shakiness 2/2 hunger as per son. Otherwise without HA, fever, chills, SOB, 

chest pain or pressure, or changes in urinary or bowel function.





OBJECTIVE:





 Vital Signs











 Period  Temp  Pulse  Resp  BP Sys/Ariza  Pulse Ox


 


 Last 24 Hr  98 F-98.7 F  80-95  12-21  /47-71  100-100











GENERAL: The patient is in good spirits. resting comfortably. Family at 

bedside. awake, alert, and fully oriented, in no acute distress.


HEAD: Normal with no signs of trauma. Without facial asymmetry or swelling 

today 


EYES: PERRL, extraocular movements intact, sclera anicteric, conjunctiva clear. 


ENT: Ears normal, nares patent, oropharynx clear without exudates, dry mucous 

membranes. +poor dentition


NECK: Trachea midline, supple. 


LUNGS: +rhonchi appreciated RLL, however may have been 2/2 pt inability to 

understand instructions. without crackles or wheezes. no accessory m usage.


HEART: Regular rate and rhythm, S1, S2 without murmur, rub or gallop.


ABDOMEN: Soft, nontender, nondistended, normoactive bowel sounds, no guarding, 

no rebound


EXTREMITIES: 2+ dp, pt pulses, warm, well-perfused, no edema. 


NEUROLOGICAL: Cranial nerves II through XII grossly intact. Normal speech. No 

facial asymmetry. Uvula unable to be visualized. 5/5 motor strength in upper 

and lower extremities, sensation intact. 2+ patellar reflexes. +exacerbated 

intention tremor upper extremities. AAOx3


PSYCH: Normal mood, normal affect.


SKIN: Warm, dry, normal turgor








 Laboratory Results - last 24 hr











  05/30/18 05/30/18 05/31/18





  14:00 18:30 05:30


 


Sodium    149 H


 


Phosphorus    2.1 L


 


CSF Glucose  112 H  


 


CSF Total Protein  79 H  


 


HIV 1&2 Antibody Screen   Negative 


 


HIV P24 Antigen   Negative 














  05/31/18 05/31/18





  05:30 10:55


 


WBC  4.9  D 


 


Hgb  11.1 


 


Hct  32.1 L 


 


Plt Count  148  D 


 


Sodium   148 H








Active Medications











Generic Name Dose Route Start Last Admin





  Trade Name Freq  PRN Reason Stop Dose Admin


 


Acetaminophen  1,000 mg  05/30/18 02:09  05/30/18 16:32





  Ofirmev Injection -  IVPB   1,000 mg





  Q8H PRN   Administration





  FEVER   


 


Chlorhexidine Gluconate  1 applic  05/29/18 22:00  05/30/18 21:20





  Hibiclens For Decolonization -  TP   1 applic





  HS MARILEE   Administration





     


 


Diphenhydramine HCl  25 mg  05/31/18 08:15  





  Benadryl Injection -  IVPUSH   





  Q6H PRN   





  FOR ITCHING   





     


 


Heparin Sodium (Porcine)  5,000 unit  05/31/18 14:00  05/31/18 13:21





  Heparin -  SQ   5,000 unit





  TID MARILEE   Administration





     


 


Norepinephrine Bitartrate 4,  500 mls @ 37.5 mls/hr  05/29/18 19:15  05/30/18 20

:05





  000 mcg/ Dextrose  IV   12 mcg/min





  TITR MARILEE   90 mls/hr





     Administration





  Protocol   





  5 MCG/MIN   


 


Vancomycin HCl 750 mg/  250 mls @ 250 mls/hr  05/30/18 09:00  05/31/18 09:47





  Dextrose  IVPB   250 mls/hr





  BID@0900,2100 MARILEE   Administration





     


 


Acyclovir 500 mg/ Dextrose  110 mls @ 110 mls/hr  05/30/18 18:00  05/31/18 11:00





  IVPB   110 mls/hr





  Q8H-IV MARILEE   Administration





     





     


 


Ceftriaxone Sodium 2 gm/  100 mls @ 200 mls/hr  05/30/18 18:15  05/31/18 05:19





  Dextrose  IVPB   200 mls/hr





  Q12H MARILEE   Administration





     





  Protocol   


 


Famotidine/Sodium Chloride  20 mg in 50 mls @ 100 mls/hr  05/30/18 22:45  05/31/ 18 09:48





  Pepcid 20 Mg Premixed Ivpb -  IVPB   100 mls/hr





  BID MARILEE   Administration





     


 


Sodium Chloride  1,000 mls @ 75 mls/hr  05/31/18 12:15  05/31/18 13:14





  1/2 Normal Saline  IV  06/01/18 01:34  75 mls/hr





  ASDIR MARILEE   Administration





     


 


Sodium Chloride  1,000 mls @ 125 mls/hr  05/31/18 12:17  





  Normal Saline -  IV   





  ASDIR MARILEE   





     


 


Mupirocin  1 applic  05/29/18 22:00  05/31/18 09:48





  Bactroban Ointment (For Decolonization) -  NS  06/03/18 21:59  1 applic





  BID MARILEE   Administration





     


 


Ondansetron HCl  4 mg  05/30/18 08:51  





  Zofran Injection  IVPUSH   





  Q6H PRN   





  NAUSEA AND/OR VOMITING   





     


 


Pantoprazole Sodium  40 mg  05/30/18 10:00  05/31/18 09:47





  Protonix -  PO   40 mg





  DAILY MARILEE   Administration





     


 


Prednisone  40 mg  05/31/18 10:00  05/31/18 09:47





  Deltasone -  PO   40 mg





  DAILY MARILEE   Administration





Microbiology





05/30/18 14:00   Cerebral Spinal Fluid - Lumbar Puncture   Gram Stain - Final


05/29/18 16:48   Urine - Urine Hurtado   Urine Culture - Final


                              NO GROWTH OBTAINED


05/30/18 14:16   Nasopharyngeal Swab   Respiratory Virus (PCR) - Preliminary


05/30/18 14:00   Cerebral Spinal Fluid - Lumbar Puncture   Viral Culture - 

Preliminary


05/30/18 14:00   Cerebral Spinal Fluid - Lumbar Puncture   Mycobacteria (PCR) - 

Preliminary


05/30/18 14:00   Cerebral Spinal Fluid - Lumbar Puncture   Cryptococcal Antigen 

- Preliminary


05/30/18 14:00   Cerebral Spinal Fluid - Lumbar Puncture   CSF Culture - 

Preliminary


05/29/18 16:48   Blood - Peripheral Venous   Blood Culture - Preliminary


                              NO GROWTH OBTAINED AFTER 24 HOURS, INCUBATION TO 

CONTINUE


                              FOR 4 DAYS.


05/29/18 16:48   Blood - Peripheral Venous   Blood Culture - Preliminary


                              NO GROWTH OBTAINED AFTER 24 HOURS, INCUBATION TO 

CONTINUE


                              FOR 4 DAYS.





Imaging





-5/29/18: CXR (series of three): line placement; image 1: no acute pathology. 

repeat: without pneumothorax. additional imaging: no obvious infiltrate or 

pleural effusion, minimal pleural thickening along the horizontal fissure





-5/30/18: Abd/Pelvis CT w/o contrast: small, b/l pleural effusions with 

atelectatic changes involving the lower lobes. additional areas of atelectasis 

in both lungs with scattered nodularity, R apical pleural thickening and faint 

groundglass opacification. these changes may be chronic in nature. no evidence 

of acute consolidation. trace ascites in lower abdomen. distended urinary 

bladder. no bony abnormalities.





-5/30/18: Head CT: moderate-sized faint low-attenuation density in the R 

frontal lobe at the level of corona radiata and centrum semiovale is highly 

suspicious for an acute/subacute infarct for which follow up CT or MRI of brain 

is recommended. no acute intracranial hemorrhage is seen. moderate soft tissue 

swelling over left side of head.





-5/30/18: GB U/S: technically limited exam 2/2 portable technique and body 

habitus. moderate GB distension is presumably physiologic. no evidence of 

cholelithiasis. no definite evidence of acute cholecystitis. trace 

pericholecystic fluid is nonspecific. no definite intrahepatic or extrahepatic 

bile duct dilation.





-5/30/18: ECHO: EF 69.1%, normal LV EF, moderate TR, RVSP elevated at 40-50 mmHg

, trace to mild MR, mild UT.





-5/30/18: Carotid doppler study: right side could not be visualized d/t 

overlying bandage. moderate intimal thickening in the mid and distal common 

casrotid artery up to the bifurcation, without evidence of hemodynamically 

significant stenosis bilaterally.





ASSESSMENT/PLAN:





63 y/o Afghan F with hx of hypothyroidism, who was brought to ED by son after 

he found her unresponsive and febrile in her room. Pt found to be in septic 

shock.





#Septic shock likely 2/2 CNS infection


-pt clinically appears improved; less lethargic


-decreased need for pressor support; off dopa gtt. 2mcg levo gtt titrated down 


-titrate to keep MAP>65


-watch fever curve


-CSF: suggests viral etiology; lymphocyte predominant. with elev protein


-Continue rocephin 2g IVPB qd, vanco 750 mg IVPB qd (Day2)


-allergic to ampicillin- angioedema


-Started on acyclovir 500mg IVPB q8h (Day2)


-will continue abx in case of abscess; will follow result of brain MRI 


-F/u CSF cx, blood cx (-) , ucx- pending


-Follow HSV titers





#R/o CVA


-Head CT: r/o acute vs. subacute frontal infarct


-F/u Brain MRI - importance d/w radiology 


-if stroke, will need ASA, statin


-speech and swallow eval when able


-Neuro on board- Dr. Stoner


-F/u B12, RPR, HIV testing





#Angioedema likely 2/2 abx 


-Likely from ampicillin


-monitor airway


-received IV medrol 125 x1, Benadryl


-continue benadryl 25mg IVP q6h


-Prednisone 40mg PO qd 


-Famotidine 20mg IVPB BID 





#Hypernatremia


-Pt with initial Na 149, repeat 148


-free water deficit approx 0.5L


-will correct with 1/2 NS 75 cc/hr -1 bag


-avoid overcorrection; no more than 6-8 mEq over 24hrs





#Bladder distension


-with hurtado; was placed 5/30


-has adequate UO 





#Tropinemia likely 2/2 demand from septic shock


-has peaked at 0.30


-0.17> 0.30>0.21


-Cardio on board: Dr. Conteh


-ECHO without evidence vegetations





#Hypothyroidism


-currently without complaint 


-as per son, pt without current PCP, however was seen at [address] [place] [address] multiple 

yrs prior 


-as per pharmacy x2, currently not on levothyroxine. Confirmed by son


-TSH 1.48


-has been d/c





#F/E/N


IV 1/2 NS 75 cc/hr - 1 bag 


Continue to follow lytes


clear liquid diet 





#PPX


DVT: hep 5000 U TID 





#Dispo


Cont'd monitoring in ICU





Visit type





- Emergency Visit


Emergency Visit: No





- New Patient


This patient is new to me today: No





- Critical Care


Critical Care patient: Yes


Total Critical Care Time (in minutes): 55


Critical Care Statement: The care of this patient involved high complexity 

decision making to prevent further life threatening deterioration of the patient

's condition and/or to evaluate & treat vital organ system(s) failure or risk 

of failure.

## 2018-05-31 NOTE — PN
Progress Note, Physician


Chief Complaint: 





Pt alert; follows all commands; moves all extremities on request; + mild 

trembling of arms that is reportedly new this admission. Pt's son is at bedside.


History of Present Illness: 





Ms. Lock is a 62 year woman with pmh of hypothyroidism and lacunar infarct CVA

, who presents for evaluation of non-responsiveness and fever. As per son who 

is with her she was largely non-responsive when he went to check on her and 

extremely sweaty. In the ER she was hypotensive and febrile and was treated as 

a case of septic shock. She got IV NS, was started on levophed and sent to the 

ICU. There was a slight improvement in her mentation and her BP has improved.








- Current Medication List


Current Medications: 


Active Medications





Acetaminophen (Ofirmev Injection -)  1,000 mg IVPB Q8H PRN


   PRN Reason: FEVER


   Last Admin: 05/30/18 16:32 Dose:  1,000 mg


Chlorhexidine Gluconate (Hibiclens For Decolonization -)  1 applic TP HS MARILEE


   Last Admin: 05/30/18 21:20 Dose:  1 applic


Diphenhydramine HCl (Benadryl Injection -)  25 mg IVPUSH Q6H PRN


   PRN Reason: FOR ITCHING


Norepinephrine Bitartrate 4, (000 mcg/ Dextrose)  500 mls @ 37.5 mls/hr IV TITR 

MARILEE; Protocol


   Last Admin: 05/30/18 20:05 Dose:  12 mcg/min, 90 mls/hr


Sodium Chloride (Normal Saline -)  1,000 mls @ 125 mls/hr IV ASDIR MARILEE


   Last Admin: 05/30/18 20:05 Dose:  125 mls/hr


Vancomycin HCl 750 mg/ (Dextrose)  250 mls @ 250 mls/hr IVPB BID@0900,2100 MARILEE


   Last Admin: 05/31/18 09:47 Dose:  250 mls/hr


Acyclovir 500 mg/ Dextrose  110 mls @ 110 mls/hr IVPB Q8H-IV MARILEE


   Last Admin: 05/31/18 01:20 Dose:  110 mls/hr


Ceftriaxone Sodium 2 gm/ (Dextrose)  100 mls @ 200 mls/hr IVPB Q12H MARILEE; 

Protocol


   Last Admin: 05/31/18 05:19 Dose:  200 mls/hr


Famotidine/Sodium Chloride (Pepcid 20 Mg Premixed Ivpb -)  20 mg in 50 mls @ 

100 mls/hr IVPB BID [facility]


   Last Admin: 05/31/18 09:48 Dose:  100 mls/hr


Mupirocin (Bactroban Ointment (For Decolonization) -)  1 applic NS BID [facility]


   Stop: 06/03/18 21:59


   Last Admin: 05/31/18 09:48 Dose:  1 applic


Ondansetron HCl (Zofran Injection)  4 mg IVPUSH Q6H PRN


   PRN Reason: NAUSEA AND/OR VOMITING


Pantoprazole Sodium (Protonix -)  40 mg PO DAILY [facility]


   Last Admin: 05/31/18 09:47 Dose:  40 mg


Prednisone (Deltasone -)  40 mg PO DAILY [facility]


   Last Admin: 05/31/18 09:47 Dose:  40 mg











- Objective


Vital Signs: 


 Vital Signs











Temperature  98.0 F   05/31/18 07:00


 


Pulse Rate  84   05/31/18 09:00


 


Respiratory Rate  14   05/31/18 09:00


 


Blood Pressure  120/53   05/31/18 09:00


 


O2 Sat by Pulse Oximetry (%)  100   05/30/18 21:00











Constitutional: Yes: Well Nourished


Eyes: Yes: WNL


HENT: Yes: WNL


Neck: Yes: WNL


Cardiovascular: Yes: Regular Rate and Rhythm


Respiratory: Yes: Regular


Gastrointestinal: Yes: Soft


...Rectal Exam: Yes: Deferred


Genitourinary: No: Anuria


Breast(s): Yes: WNL


Musculoskeletal: Yes: Muscle Weakness


Extremities: Yes: Other (mild trembling of hands when outstretched)


Peripheral Pulses WNL: Yes


Integumentary: Yes: WNL


Neurological: Yes: Alert, Oriented, Weakness


Psychiatric: Yes: WNL


Labs: 


 CBC, BMP





 05/31/18 05:30 





 05/31/18 05:30 





 INR, PTT











INR  1.23  (0.82-1.09)  H  05/29/18  16:40    








 Abnormal Lab Results











  05/31/18 05/31/18 06/01/18





  05:30 10:55 06:40


 


RBC    2.97 L


 


Hgb    9.3 L D


 


Hct    26.8 L D


 


Sodium   148 H 


 


Chloride   


 


Anion Gap   


 


Creatinine   


 


Random Glucose   


 


Calcium   


 


CK-MB (CK-2)  12.058 H  


 


Total Protein   


 


Albumin   














  06/01/18





  06:40


 


RBC 


 


Hgb 


 


Hct 


 


Sodium 


 


Chloride  114 H


 


Anion Gap  7 L


 


Creatinine  1.1 H


 


Random Glucose  169 H


 


Calcium  7.8 L


 


CK-MB (CK-2) 


 


Total Protein  5.6 L


 


Albumin  2.9 L














Problem List





- Problems


(1) Septic shock


Assessment/Plan: 


Now off vasopressors; BP holding.


Maintain hydration.


Orthostatic vital signs.


On antivirals and antibiotics.





ECHO: normal LVEF; no significant valvular abnormalities.


Code(s): A41.9 - SEPSIS, UNSPECIFIED ORGANISM; R65.21 - SEVERE SEPSIS WITH 

SEPTIC SHOCK   





(2) Viral meningitis, unspecified


Assessment/Plan: 


ID workup in progress; on antibirals. Antibiotics may be stopped soon.


Code(s): A87.9 - VIRAL MENINGITIS, UNSPECIFIED

## 2018-05-31 NOTE — PN
Physical Exam: 


SUBJECTIVE: Patient seen and examined. Offers no new complaints except for L 

mandibular pain. Patients mental status improved and family says she is back at 

baseline. s/p LP yesterday. Glucose and proteins elevated. 10 wbc predominantly 

lymphocytes. 








OBJECTIVE:





 Vital Signs











 Period  Temp  Pulse  Resp  BP Sys/Ariza  Pulse Ox


 


 Last 24 Hr  98 F-98.7 F  80-95  12-21  /47-71  100-100











GENERAL: a/o x 3, in no acute distress


EYES: EOMI, extraocular movements intact, conjunctiva clear. 


ENT: oropharynx clear without exudates, dry mucous membranes


NECK: supple. 


LUNGS: Breath sounds equal, clear to auscultation bilaterally


HEART: Regular rate and rhythm, S1, S2 without murmur, rub or gallop.


ABDOMEN: Soft, nontender, nondistended, normoactive bowel sounds


EXTREMITIES: 2+ pulses, warm, well-perfused, no edema. 


NEUROLOGICAL: Cranial nerves II through XII grossly intact. Normal speech. 

strength 5/5 throughout, sensations equal and intact throughout


PSYCH: Normal mood, normal affect.

















 Laboratory Results - last 24 hr











  05/30/18 05/30/18 05/30/18





  00:39 05:30 05:30


 


WBC   


 


RBC   


 


Hgb   


 


Hct   


 


MCV   


 


MCH   


 


MCHC   


 


RDW   


 


Plt Count   


 


MPV   


 


Neutrophils %   


 


Lymphocytes %   


 


Monocytes %   


 


Eosinophils %   


 


Basophils %   


 


Nucleated RBC %   


 


Sodium    136


 


Potassium    3.5


 


Chloride    108 H


 


Carbon Dioxide    20 L


 


Anion Gap    8


 


BUN    11


 


Creatinine    1.0


 


Creat Clearance w eGFR   


 


Random Glucose    172 H


 


Calcium    6.7 L*


 


Phosphorus    2.1 L


 


Magnesium    1.5 L


 


Total Bilirubin   


 


AST   


 


ALT   


 


Alkaline Phosphatase   


 


Creatine Kinase  291 H   424 H


 


Creatine Kinase Index  1.4   1.8


 


CK-MB (CK-2)  4.177 H   7.663 H


 


Troponin I  0.30 H   0.21 H


 


Total Protein   


 


Albumin   


 


Triglycerides   


 


Cholesterol   


 


Total LDL Cholesterol   


 


HDL Cholesterol   


 


Total Amylase   


 


Lipase   


 


Vitamin B12  379   Cancelled


 


Cortisol AM Sample   1.2 


 


CSF Appearance   


 


CSF Color   


 


CSF WBC   


 


CSF RBC   


 


CSF Neutrophils   


 


CSF Lymphocytes   


 


CSF Monocytes   


 


CSF Eosinophils   


 


CSF Basophils   


 


CSF Macrophages   


 


CSF Plasma Cells   


 


CSF Diff Comment   


 


CSF Comment   


 


CSF Glucose   


 


CSF Total Protein   


 


RPR Titer   


 


HIV 1&2 Antibody Screen   


 


HIV P24 Antigen   














  05/30/18 05/30/18 05/30/18





  08:00 08:00 14:00


 


WBC   


 


RBC   


 


Hgb   


 


Hct   


 


MCV   


 


MCH   


 


MCHC   


 


RDW   


 


Plt Count   


 


MPV   


 


Neutrophils %   


 


Lymphocytes %   


 


Monocytes %   


 


Eosinophils %   


 


Basophils %   


 


Nucleated RBC %   


 


Sodium   


 


Potassium   


 


Chloride   


 


Carbon Dioxide   


 


Anion Gap   


 


BUN   


 


Creatinine   


 


Creat Clearance w eGFR   


 


Random Glucose   


 


Calcium   


 


Phosphorus   


 


Magnesium   


 


Total Bilirubin   


 


AST   


 


ALT   


 


Alkaline Phosphatase   


 


Creatine Kinase   


 


Creatine Kinase Index   


 


CK-MB (CK-2)   


 


Troponin I   


 


Total Protein   


 


Albumin   


 


Triglycerides   


 


Cholesterol   


 


Total LDL Cholesterol   


 


HDL Cholesterol   


 


Total Amylase   


 


Lipase   


 


Vitamin B12  Cancelled  


 


Cortisol AM Sample   


 


CSF Appearance    Clear


 


CSF Color    Colorless


 


CSF WBC    13


 


CSF RBC    3


 


CSF Neutrophils    0


 


CSF Lymphocytes    98


 


CSF Monocytes    0


 


CSF Eosinophils    0


 


CSF Basophils    0


 


CSF Macrophages    2


 


CSF Plasma Cells    0


 


CSF Diff Comment    No Result Required.


 


CSF Comment    No Result Required.


 


CSF Glucose    112 H


 


CSF Total Protein    79 H


 


RPR Titer   Nonreactive 


 


HIV 1&2 Antibody Screen   


 


HIV P24 Antigen   














  05/30/18 05/30/18 05/30/18





  14:00 14:00 18:30


 


WBC   


 


RBC   


 


Hgb   


 


Hct   


 


MCV   


 


MCH   


 


MCHC   


 


RDW   


 


Plt Count   


 


MPV   


 


Neutrophils %   


 


Lymphocytes %   


 


Monocytes %   


 


Eosinophils %   


 


Basophils %   


 


Nucleated RBC %   


 


Sodium   


 


Potassium   


 


Chloride   


 


Carbon Dioxide   


 


Anion Gap   


 


BUN   


 


Creatinine   


 


Creat Clearance w eGFR   


 


Random Glucose   


 


Calcium   


 


Phosphorus   


 


Magnesium   


 


Total Bilirubin   


 


AST   


 


ALT   


 


Alkaline Phosphatase   


 


Creatine Kinase   


 


Creatine Kinase Index   


 


CK-MB (CK-2)   


 


Troponin I   


 


Total Protein   


 


Albumin   


 


Triglycerides   


 


Cholesterol   


 


Total LDL Cholesterol   


 


HDL Cholesterol   


 


Total Amylase   


 


Lipase   


 


Vitamin B12   


 


Cortisol AM Sample   


 


CSF Appearance   


 


CSF Color   


 


CSF WBC   


 


CSF RBC   


 


CSF Neutrophils   


 


CSF Lymphocytes   


 


CSF Monocytes   


 


CSF Eosinophils   


 


CSF Basophils   


 


CSF Macrophages   


 


CSF Plasma Cells   


 


CSF Diff Comment   


 


CSF Comment   


 


CSF Glucose  Cancelled  


 


CSF Total Protein   Cancelled 


 


RPR Titer   


 


HIV 1&2 Antibody Screen    Negative


 


HIV P24 Antigen    Negative














  05/31/18 05/31/18 05/31/18





  05:30 05:30 05:30


 


WBC    4.9  D


 


RBC    3.58 L


 


Hgb    11.1


 


Hct    32.1 L


 


MCV    89.7


 


MCH    31.1


 


MCHC    34.7


 


RDW    13.9


 


Plt Count    148  D


 


MPV    7.6


 


Neutrophils %    89.9 H D


 


Lymphocytes %    8.4  D


 


Monocytes %    1.5 L


 


Eosinophils %    0.1  D


 


Basophils %    0.1


 


Nucleated RBC %    0


 


Sodium   149 H 


 


Potassium   3.5 


 


Chloride   118 H 


 


Carbon Dioxide   22 


 


Anion Gap   9 


 


BUN   7 


 


Creatinine   0.9 


 


Creat Clearance w eGFR   > 60 


 


Random Glucose   179 H 


 


Calcium   7.9 L 


 


Phosphorus   2.1 L 


 


Magnesium   2.0 


 


Total Bilirubin   0.4  D 


 


AST   47 H 


 


ALT   27 


 


Alkaline Phosphatase   65 


 


Creatine Kinase   


 


Creatine Kinase Index   


 


CK-MB (CK-2)   


 


Troponin I   


 


Total Protein   6.3 L 


 


Albumin   3.1 L 


 


Triglycerides   


 


Cholesterol   


 


Total LDL Cholesterol   


 


HDL Cholesterol   


 


Total Amylase  26  


 


Lipase   54 L 


 


Vitamin B12   


 


Cortisol AM Sample   


 


CSF Appearance   


 


CSF Color   


 


CSF WBC   


 


CSF RBC   


 


CSF Neutrophils   


 


CSF Lymphocytes   


 


CSF Monocytes   


 


CSF Eosinophils   


 


CSF Basophils   


 


CSF Macrophages   


 


CSF Plasma Cells   


 


CSF Diff Comment   


 


CSF Comment   


 


CSF Glucose   


 


CSF Total Protein   


 


RPR Titer   


 


HIV 1&2 Antibody Screen   


 


HIV P24 Antigen   














  05/31/18 05/31/18 05/31/18





  05:30 05:30 10:55


 


WBC   


 


RBC   


 


Hgb   


 


Hct   


 


MCV   


 


MCH   


 


MCHC   


 


RDW   


 


Plt Count   


 


MPV   


 


Neutrophils %   


 


Lymphocytes %   


 


Monocytes %   


 


Eosinophils %   


 


Basophils %   


 


Nucleated RBC %   


 


Sodium    148 H


 


Potassium   


 


Chloride   


 


Carbon Dioxide   


 


Anion Gap   


 


BUN   


 


Creatinine   


 


Creat Clearance w eGFR   


 


Random Glucose   


 


Calcium   


 


Phosphorus   


 


Magnesium   


 


Total Bilirubin   


 


AST   


 


ALT   


 


Alkaline Phosphatase   


 


Creatine Kinase  357 H  


 


Creatine Kinase Index  3.3  


 


CK-MB (CK-2)  12.058 H  


 


Troponin I   


 


Total Protein   


 


Albumin   


 


Triglycerides   139 


 


Cholesterol   140 


 


Total LDL Cholesterol   85 


 


HDL Cholesterol   30 L 


 


Total Amylase   


 


Lipase   


 


Vitamin B12   


 


Cortisol AM Sample   


 


CSF Appearance   


 


CSF Color   


 


CSF WBC   


 


CSF RBC   


 


CSF Neutrophils   


 


CSF Lymphocytes   


 


CSF Monocytes   


 


CSF Eosinophils   


 


CSF Basophils   


 


CSF Macrophages   


 


CSF Plasma Cells   


 


CSF Diff Comment   


 


CSF Comment   


 


CSF Glucose   


 


CSF Total Protein   


 


RPR Titer   


 


HIV 1&2 Antibody Screen   


 


HIV P24 Antigen   








Active Medications











Generic Name Dose Route Start Last Admin





  Trade Name Freq  PRN Reason Stop Dose Admin


 


Acetaminophen  1,000 mg  05/30/18 02:09  05/30/18 16:32





  Ofirmev Injection -  IVPB   1,000 mg





  Q8H PRN   Administration





  FEVER   





     





     





     


 


Chlorhexidine Gluconate  1 applic  05/29/18 22:00  05/30/18 21:20





  Hibiclens For Decolonization -  TP   1 applic





  HS MARILEE   Administration





     





     





     





     


 


Diphenhydramine HCl  25 mg  05/31/18 08:15  





  Benadryl Injection -  IVPUSH   





  Q6H PRN   





  FOR ITCHING   





     





     





     


 


Heparin Sodium (Porcine)  5,000 unit  05/31/18 14:00  05/31/18 13:21





  Heparin -  SQ   5,000 unit





  TID MARILEE   Administration





     





     





     





     


 


Norepinephrine Bitartrate 4,  500 mls @ 37.5 mls/hr  05/29/18 19:15  05/30/18 20

:05





  000 mcg/ Dextrose  IV   12 mcg/min





  TITR MARILEE   90 mls/hr





     Administration





     





  Protocol   





  5 MCG/MIN   


 


Vancomycin HCl 750 mg/  250 mls @ 250 mls/hr  05/30/18 09:00  05/31/18 09:47





  Dextrose  IVPB   250 mls/hr





  BID@0900,2100 MARILEE   Administration





     





     





     





     


 


Acyclovir 500 mg/ Dextrose  110 mls @ 110 mls/hr  05/30/18 18:00  05/31/18 11:00





  IVPB   110 mls/hr





  Q8H-IV MARILEE   Administration





     





     





     





     


 


Ceftriaxone Sodium 2 gm/  100 mls @ 200 mls/hr  05/30/18 18:15  05/31/18 05:19





  Dextrose  IVPB   200 mls/hr





  Q12H MARILEE   Administration





     





     





  Protocol   





     


 


Famotidine/Sodium Chloride  20 mg in 50 mls @ 100 mls/hr  05/30/18 22:45  05/31/ 18 09:48





  Pepcid 20 Mg Premixed Ivpb -  IVPB   100 mls/hr





  BID MARILEE   Administration





     





     





     





     


 


Sodium Chloride  1,000 mls @ 75 mls/hr  05/31/18 12:15  05/31/18 13:14





  1/2 Normal Saline  IV  06/01/18 01:34  75 mls/hr





  ASDIR MARILEE   Administration





     





     





     





     


 


Sodium Chloride  1,000 mls @ 125 mls/hr  05/31/18 12:17  





  Normal Saline -  IV   





  ASDIR MARILEE   





     





     





     





     


 


Mupirocin  1 applic  05/29/18 22:00  05/31/18 09:48





  Bactroban Ointment (For Decolonization) -  NS  06/03/18 21:59  1 applic





  BID MARILEE   Administration





     





     





     





     


 


Ondansetron HCl  4 mg  05/30/18 08:51  





  Zofran Injection  IVPUSH   





  Q6H PRN   





  NAUSEA AND/OR VOMITING   





     





     





     


 


Pantoprazole Sodium  40 mg  05/30/18 10:00  05/31/18 09:47





  Protonix -  PO   40 mg





  DAILY MARILEE   Administration





     





     





     





     


 


Prednisone  40 mg  05/31/18 10:00  05/31/18 09:47





  Deltasone -  PO   40 mg





  DAILY MARILEE   Administration





     





     





     





     











ASSESSMENT/PLAN:





62F with PMH of hypothyoidism, presented to hospital with lethargy/fever/

vomiting, admitted for septic shock.








#Septic shock secondary to viral meningitis


- continue Acyclovir 


- continue IV Vancomycin and IV Ceftriaxone and droplet precautions pending 

results of cultures tomorrow


- f/u CSF cultures


- f/u brain MRI


- afebrile


- off pressers. Bp stable. Will now remove line.


- Neuro recs appreciated








#AMS


-likely  encephalitis from viral meningitis


-patient back at baseline per Family


-cont. Acyclovir


-MRA of neck to rule out thrombus. patient with neck pain.





#Angioedema


-resolved


-benadryl prn


-1x medrol


-po prednisone








#FEN


-1/2 NS @75


-phos repleted


-WNL











#PPx


-hep sq














Transfer to med-surge

























































































Visit type





- Emergency Visit


Emergency Visit: Yes


ED Registration Date: 05/29/18


Care time: The patient presented to the Emergency Department on the above date 

and was hospitalized for further evaluation of their emergent condition.





- New Patient


This patient is new to me today: Yes


Date on this admission: 05/31/18





- Critical Care


Critical Care patient: Yes


Total Critical Care Time (in minutes): 40


Critical Care Statement: The care of this patient involved high complexity 

decision making to prevent further life threatening deterioration of the patient

's condition and/or to evaluate & treat vital organ system(s) failure or risk 

of failure.

## 2018-05-31 NOTE — PN
Teaching Attending Note


Name of Resident: Faith Chou





ATTENDING PHYSICIAN STATEMENT





I saw and evaluated the patient.


I reviewed the resident's note and discussed the case with the resident.


I agree with the resident's findings and plan as documented.








SUBJECTIVE:


No fever or chills . feels much better today. has pain in L sided neck that 

started last night. denies any pain in ears. no pain in throat , no difficulty 

swallowing and no SOB. family think that she is at her base line mentation . 








OBJECTIVE:


awake, alert , cooperative 


L facial swelling is much less this am , with no lip edema , nl tongue , slight 

erythema in uvula  but no edema .L eye lid edema improved


TTP on the anterior aspect of the sternomastoid muscle 


CV: RRR, no MRG 


Lungs: CTAB 


Ext: no edema or erythema . no rash. 


Neuro: EOMI, round equal pupils , reactive to light. nof acial droop. uvula and 

tongue at mid brandin e. facial sensation to light touch NL.


 Strength : shoulder shrug 5/5 b/l. biceps /triceps 5/5 . not cooperative with 

deltoid exam. nl hand  b/l 


LE : hip flexion 5/5 , ankle dorsiflexion and plantar flexion 5/5 . unable to 

assess knee flexion and extension 


Nl sensation to light touch 


2+ biceps , 1+ BR, and unable to assess knee jerk reflexes 


Nl nose to finger. 


tremor in hands especially L 








ASSESSMENT AND PLAN:





61 y/o lady with h/o hypothyroidism, and CVA who presented with AMS and was 

found to have fevers and  shock requiring pressors 





1- Septic Shock:BP improved , on lower dose of levophed now.


- taper levophed off 


- cont IVF 


- follow blood cx 








2- AMS, encephalopathy due to viral  meningitis /cerebritis 


Mental status improved . 


- cont acyclovir /IVF hydration 


- cont Abx pending cultures and MRI 


- due to L neck pain and tenderness along with CT findings, will obtain MRA of 

neck to r/o vessel thrombosis 


- RPR non reactive . B12 > 300 . HIV neg. 








3-Anioedema : improved 


- switch to po predniosne 


- cont benadryl and H2 blockers  








4- Elevated troponins: likely demand ischemia . 


Echo reviewed. trop trended down 





5- H/o Hypothyroidism: will confirm with PCP . not on any synthroid at home. dc 

synthroid 





6- hypernatremia: ? dehydration . will repeat Na as it increased quickly from 

yesterdays' level. if still the same, will change IVF to 1/2 NS 








7- DVT PX  . heparin SQ 











ASSESSMENT AND PLAN:

## 2018-05-31 NOTE — PN
Teaching Attending Note


Name of Resident: Clarissa Palacios





ATTENDING PHYSICIAN STATEMENT





I saw and evaluated the patient.


I reviewed the resident's note and discussed the case with the resident.


I agree with the resident's findings and plan as documented.








SUBJECTIVE:





Pt seen and examined in the ICU. Mental status much improved. Fever curve down. 

LP done yesterday showing 10 WBC with lymphocyte predominance. 





OBJECTIVE:





 Vital Signs











 Period  Temp  Pulse  Resp  BP Sys/Ariza  Pulse Ox


 


 Last 24 Hr  98 F-98.7 F  80-95  12-21  105-139/47-71  100-100








 Intake & Output











 05/28/18 05/29/18 05/30/18 05/31/18





 23:59 23:59 23:59 23:59


 


Intake Total   4900 1110


 


Output Total  200 5050 1800


 


Balance  -200 -150 -690


 


Weight  43.545 kg 43.545 kg 








Gen:  NAD at rest


Heart: RRR


Lung: decreased breath sounds at the bases


Abd: soft, nontender


Ext: no edema





 CBC, BMP





 05/31/18 05:30 





 05/31/18 10:55 





Active Medications





Acetaminophen (Ofirmev Injection -)  1,000 mg IVPB Q8H PRN


   PRN Reason: FEVER


   Last Admin: 05/30/18 16:32 Dose:  1,000 mg


Calcium Acetate (Phoslo -)  667 mg PO ONCE ONE


   Stop: 05/31/18 12:31


Chlorhexidine Gluconate (Hibiclens For Decolonization -)  1 applic TP HS MARILEE


   Last Admin: 05/30/18 21:20 Dose:  1 applic


Diphenhydramine HCl (Benadryl Injection -)  25 mg IVPUSH Q6H PRN


   PRN Reason: FOR ITCHING


Heparin Sodium (Porcine) (Heparin -)  5,000 unit SQ TID MARILEE


Norepinephrine Bitartrate 4, (000 mcg/ Dextrose)  500 mls @ 37.5 mls/hr IV TITR 

MARILEE; Protocol


   Last Admin: 05/30/18 20:05 Dose:  12 mcg/min, 90 mls/hr


Vancomycin HCl 750 mg/ (Dextrose)  250 mls @ 250 mls/hr IVPB BID@0900,2100 MARILEE


   Last Admin: 05/31/18 09:47 Dose:  250 mls/hr


Acyclovir 500 mg/ Dextrose  110 mls @ 110 mls/hr IVPB Q8H-IV MARILEE


   Last Admin: 05/31/18 11:00 Dose:  110 mls/hr


Ceftriaxone Sodium 2 gm/ (Dextrose)  100 mls @ 200 mls/hr IVPB Q12H MARILEE; 

Protocol


   Last Admin: 05/31/18 05:19 Dose:  200 mls/hr


Famotidine/Sodium Chloride (Pepcid 20 Mg Premixed Ivpb -)  20 mg in 50 mls @ 

100 mls/hr IVPB BID MARILEE


   Last Admin: 05/31/18 09:48 Dose:  100 mls/hr


Sodium Chloride (1/2 Normal Saline)  1,000 mls @ 75 mls/hr IV ASDIR MARILEE


   Stop: 06/01/18 01:34


Sodium Chloride (Normal Saline -)  1,000 mls @ 125 mls/hr IV ASDIR MARILEE


Mupirocin (Bactroban Ointment (For Decolonization) -)  1 applic NS BID [facility]


   Stop: 06/03/18 21:59


   Last Admin: 05/31/18 09:48 Dose:  1 applic


Ondansetron HCl (Zofran Injection)  4 mg IVPUSH Q6H PRN


   PRN Reason: NAUSEA AND/OR VOMITING


Pantoprazole Sodium (Protonix -)  40 mg PO DAILY [facility]


   Last Admin: 05/31/18 09:47 Dose:  40 mg


Prednisone (Deltasone -)  40 mg PO DAILY [facility]


   Last Admin: 05/31/18 09:47 Dose:  40 mg








ASSESSMENT AND PLAN:


Altered Mental Status improved


Septic Shock


r/o Viral Meningitis/Encephalitis


Lactic Acidosis improved


+Troponins likely Demand Ischemia


Acute Kidney Injury improved


Hypothyroidism





-  continue antibiotics per ID


-  f/u CSF cultures


-  MRI brain


-  pressors tapered off, maintain MAP >65


-  monitor urine output, creatinine


-  aspiration precautions


-  DVT prophylaxis


-  can transfer to floor











critical care time spent in reviewing chart, evaluating patient and formulating 

plan 35 min

## 2018-06-01 LAB
ALBUMIN SERPL-MCNC: 2.9 G/DL (ref 3.4–5)
ALP SERPL-CCNC: 48 U/L (ref 45–117)
ALT SERPL-CCNC: 23 U/L (ref 12–78)
ANION GAP SERPL CALC-SCNC: 7 MMOL/L (ref 8–16)
AST SERPL-CCNC: 26 U/L (ref 15–37)
BILIRUB SERPL-MCNC: 0.2 MG/DL (ref 0.2–1)
BUN SERPL-MCNC: 13 MG/DL (ref 7–18)
CALCIUM SERPL-MCNC: 7.8 MG/DL (ref 8.5–10.1)
CHLORIDE SERPL-SCNC: 114 MMOL/L (ref 98–107)
CO2 SERPL-SCNC: 24 MMOL/L (ref 21–32)
CREAT SERPL-MCNC: 1.1 MG/DL (ref 0.55–1.02)
DEPRECATED RDW RBC AUTO: 14.3 % (ref 11.6–15.6)
GLUCOSE SERPL-MCNC: 169 MG/DL (ref 74–106)
HCT VFR BLD CALC: 26.8 % (ref 32.4–45.2)
HGB BLD-MCNC: 9.3 GM/DL (ref 10.7–15.3)
MAGNESIUM SERPL-MCNC: 1.9 MG/DL (ref 1.8–2.4)
MCH RBC QN AUTO: 31.3 PG (ref 25.7–33.7)
MCHC RBC AUTO-ENTMCNC: 34.7 G/DL (ref 32–36)
MCV RBC: 90.1 FL (ref 80–96)
PHOSPHATE SERPL-MCNC: 2.6 MG/DL (ref 2.5–4.9)
PLATELET # BLD AUTO: 141 K/MM3 (ref 134–434)
PMV BLD: 8.1 FL (ref 7.5–11.1)
POTASSIUM SERPLBLD-SCNC: 4 MMOL/L (ref 3.5–5.1)
PROT SERPL-MCNC: 5.6 G/DL (ref 6.4–8.2)
RBC # BLD AUTO: 2.97 M/MM3 (ref 3.6–5.2)
SODIUM SERPL-SCNC: 145 MMOL/L (ref 136–145)
WBC # BLD AUTO: 5.4 K/MM3 (ref 4–10)

## 2018-06-01 RX ADMIN — HEPARIN SODIUM SCH UNIT: 5000 INJECTION, SOLUTION INTRAVENOUS; SUBCUTANEOUS at 16:08

## 2018-06-01 RX ADMIN — POTASSIUM & SODIUM PHOSPHATES POWDER PACK 280-160-250 MG SCH PACKET: 280-160-250 PACK at 09:49

## 2018-06-01 RX ADMIN — HEPARIN SODIUM SCH UNIT: 5000 INJECTION, SOLUTION INTRAVENOUS; SUBCUTANEOUS at 21:01

## 2018-06-01 RX ADMIN — ACYCLOVIR SODIUM SCH MLS/HR: 50 INJECTION, SOLUTION INTRAVENOUS at 17:24

## 2018-06-01 RX ADMIN — PANTOPRAZOLE SODIUM SCH MG: 40 TABLET, DELAYED RELEASE ORAL at 09:49

## 2018-06-01 RX ADMIN — FAMOTIDINE SCH MLS/HR: 20 INJECTION, SOLUTION INTRAVENOUS at 21:01

## 2018-06-01 RX ADMIN — VANCOMYCIN HYDROCHLORIDE SCH MLS/HR: 750 INJECTION, POWDER, LYOPHILIZED, FOR SOLUTION INTRAVENOUS at 09:49

## 2018-06-01 RX ADMIN — FAMOTIDINE SCH MLS/HR: 20 INJECTION, SOLUTION INTRAVENOUS at 09:49

## 2018-06-01 RX ADMIN — ACYCLOVIR SODIUM SCH MLS/HR: 50 INJECTION, SOLUTION INTRAVENOUS at 05:27

## 2018-06-01 RX ADMIN — VANCOMYCIN HYDROCHLORIDE SCH MLS/HR: 750 INJECTION, POWDER, LYOPHILIZED, FOR SOLUTION INTRAVENOUS at 20:58

## 2018-06-01 RX ADMIN — POTASSIUM & SODIUM PHOSPHATES POWDER PACK 280-160-250 MG SCH PACKET: 280-160-250 PACK at 21:01

## 2018-06-01 RX ADMIN — HEPARIN SODIUM SCH UNIT: 5000 INJECTION, SOLUTION INTRAVENOUS; SUBCUTANEOUS at 06:39

## 2018-06-01 NOTE — PN
Teaching Attending Note


Name of Resident: Faith Chou





ATTENDING PHYSICIAN STATEMENT





I saw and evaluated the patient.


I reviewed the resident's note and discussed the case with the resident.


I agree with the resident's findings and plan as documented.








SUBJECTIVE:


No fever or chills. No HA or visual changes , no wheezing or SOB. 


feels back to NL 





OBJECTIVE:


awake, alert , cooperative 


L facial swelling almost completely resolved. no lip or eye lid edema  . unable 

to visualize uvula today 


CV: RRR, no MRG 


Lungs: CTAB 


Ext: no edema or erythema. no rash. 


Neuro: EOMI, round equal pupils , reactive to light. nof acial droop. uvula and 

tongue at mid rbandin e. facial sensation to light touch NL.


 Strength : shoulder shrug 5/5 b/l. biceps /triceps 5/5 . not cooperative with 

deltoid exam. nl hand  b/l 


LE : hip flexion 5/5 , ankle dorsiflexion and plantar flexion 5/5 . 5/5 knee 

flexion and extension 


Nl sensation to light touch 


2+ biceps, and unable to assess knee jerk reflexes.











ASSESSMENT AND PLAN:





63 y/o lady with h/o CVA who presented with AMS and was found to have fevers 

and  shock requiring pressors 





1- Septic Shock:resolved 


- cont IVF . change to NS 


- follow blood cx 





2- AMS, encephalopathy due to viral  meningitis


Mental status is back to Normal 


- cont acyclovir /IVF hydration 


- follow HSV PCR in CSF 


- Abx per ID. 


- MRI/MRA with no abnormalities


- monitor renal function on acyclovir  





3-Anioedema : improved 


- switch to po predniosne 


- cont benadryl and H2 blockers  








4- Elevated troponins: likely demand ischemia .  





5- confirmed with patient and patient family. not on any synthroid at home 





6- hypernatremia:resolved 





7- DVT PX  . heparin SQ

## 2018-06-01 NOTE — PN
Physical Exam: 


SUBJECTIVE: Patient seen and examined at bedside. No acute events overnight, 

afebrile. Today, pt c/o multiple loose BM's. Most recently, with fecal 

incontinence. Denies muscle weakness, numbness, or swelling. 





OBJECTIVE:





 Vital Signs











 Period  Temp  Pulse  Resp  BP Sys/Ariza  Pulse Ox


 


 Last 24 Hr  97.5 F-98.6 F  82-91  20-20  115-145/60-87  100-100











GENERAL: The patient is in good spirits. Happy and with family. awake, alert, 

and fully oriented, in no acute distress.


HEAD: Normal with no signs of trauma.


EYES: PERRL, extraocular movements intact, sclera anicteric, conjunctiva clear. 

No ptosis. Without facial swelling


ENT: Ears normal, nares patent, oropharynx clear. +poor dentition


NECK: Trachea midline, supple. 


LUNGS: Breath sounds equal, clear to auscultation bilaterally, no wheezes, no 

crackles, no accessory m usage


HEART: Regular rate and rhythm, S1, S2 without murmur, rub or gallop.


ABDOMEN: Soft, +epigastric TTP, nondistended, normoactive bowel sounds, mild 

guarding


EXTREMITIES: 2+ pt pulses, warm, well-perfused, no edema. 


NEUROLOGICAL: Cranial nerves II through XII intact. Motor strength 4/5 upper 

and lower extremities. sensation intact. cerebellar fnc intact however still 

with intention tremor.


PSYCH: Positive mood, normal affect.


SKIN: Warm, dry, normal turgor








 Laboratory Results - last 24 hr











  05/30/18 05/30/18 06/01/18





  14:00 18:30 06:40


 


WBC    5.4


 


RBC    2.97 L


 


Hgb    9.3 L D


 


Hct    26.8 L D


 


MCV    90.1


 


MCH    31.3


 


MCHC    34.7


 


RDW    14.3


 


Plt Count    141


 


MPV    8.1


 


CSF VDRL  Non reactive  


 


TB Test (QFT)   Negative 














  06/01/18 06/01/18





  06:40 06:40


 


RDW  


 


MPV  


 


Sodium  145 


 


Potassium  4.0 


 


Chloride  114 H 


 


Carbon Dioxide  24 


 


Anion Gap  7 L 


 


BUN  13 


 


Creatinine  1.1 H 


 


Creat Clearance w eGFR  50.01 


 


Random Glucose  169 H 


 


Calcium  7.8 L 


 


Phosphorus  2.6 


 


Magnesium  1.9 


 


Total Bilirubin  0.2  D 


 


AST  26 


 


ALT  23 


 


Alkaline Phosphatase  48 


 


Creatine Kinase   167


 


Creatine Kinase Index   4.0


 


CK-MB (CK-2)   6.789 H


 


Total Protein  5.6 L 


 


Albumin  2.9 L 








Active Medications











Generic Name Dose Route Start Last Admin





  Trade Name Freq  PRN Reason Stop Dose Admin


 


Diphenhydramine HCl  25 mg  05/31/18 21:02  





  Benadryl Injection -  IVPUSH   





  Q6H PRN   





  FOR ITCHING   





     





     


 


Heparin Sodium (Porcine)  5,000 unit  05/31/18 14:00  06/01/18 16:08





  Heparin -  SQ   5,000 unit





  TID MARILEE   Administration





     





     


 


Sodium Chloride  1,000 mls @ 125 mls/hr  05/31/18 12:17  06/01/18 12:37





  Normal Saline -  IV   125 mls/hr





  ASDIR MARILEE   Administration





     


 


Famotidine/Sodium Chloride  20 mg in 50 mls @ 100 mls/hr  05/31/18 22:00  06/01/ 18 09:49





  Pepcid 20 Mg Premixed Ivpb -  IVPB   100 mls/hr





  BID MARILEE   Administration





     


 


Vancomycin HCl 750 mg/  250 mls @ 250 mls/hr  05/31/18 21:00  06/01/18 09:49





  Dextrose  IVPB   250 mls/hr





  BID@0900,2100 MARILEE   Administration





     


 


Acyclovir 500 mg/ Dextrose  110 mls @ 110 mls/hr  06/01/18 06:00  06/01/18 17:24





  IVPB   110 mls/hr





  Q12H MARILEE   Administration





     


 


Ondansetron HCl  4 mg  05/31/18 20:38  





  Zofran Injection  IVPUSH   





  Q6H PRN   





  NAUSEA AND/OR VOMITING   


 


Pantoprazole Sodium  40 mg  06/01/18 10:00  06/01/18 09:49





  Protonix -  PO   40 mg





  DAILY MARILEE   Administration





     


 


Potassium Phos/Sodium Phos  1 packet  05/31/18 14:00  06/01/18 09:49





  Phos-Nak Packet -  PO   1 packet





  BID MARILEE   Administration








Imaging





-5/29/18: CXR (series of three): line placement; image 1: no acute pathology. 

repeat: without pneumothorax. additional imaging: no obvious infiltrate or 

pleural effusion, minimal pleural thickening along the horizontal fissure





-5/30/18: Abd/Pelvis CT w/o contrast: small, b/l pleural effusions with 

atelectatic changes involving the lower lobes. additional areas of atelectasis 

in both lungs with scattered nodularity, R apical pleural thickening and faint 

groundglass opacification. these changes may be chronic in nature. no evidence 

of acute consolidation. trace ascites in lower abdomen. distended urinary 

bladder. no bony abnormalities.





-5/30/18: Head CT: moderate-sized faint low-attenuation density in the R 

frontal lobe at the level of corona radiata and centrum semiovale is highly 

suspicious for an acute/subacute infarct for which follow up CT or MRI of brain 

is recommended. no acute intracranial hemorrhage is seen. moderate soft tissue 

swelling over left side of head.





-5/30/18: GB U/S: technically limited exam 2/2 portable technique and body 

habitus. moderate GB distension is presumably physiologic. no evidence of 

cholelithiasis. no definite evidence of acute cholecystitis. trace 

pericholecystic fluid is nonspecific. no definite intrahepatic or extrahepatic 

bile duct dilation.





-5/30/18: ECHO: EF 69.1%, normal LV EF, moderate TR, RVSP elevated at 40-50 mmHg

, trace to mild MR, mild FL.





-5/30/18: Carotid doppler study: right side could not be visualized d/t 

overlying bandage. moderate intimal thickening in the mid and distal common 

casrotid artery up to the bifurcation, without evidence of hemodynamically 

significant stenosis bilaterally.





-6/1/18: Brain MRI/Neck MRA: mild to moderate volume loss and very minimal 

periventricular chronic microvascular ischemic disease changes. no acute 

intracranial pathology or abnormal enhancement are identified. previously 

described low attenuation density in the R MCA territory represents an artifact

, in retrospect. MRA neck: vertebral arteries are unremarkable. common carotid 

and bifurcation unremarkable without evidence of stenosis b/l





ASSESSMENT/PLAN:





63 y/o [place] F with hx of hypothyroidism, who was brought to ED by son after 

he found her unresponsive and febrile in her room. Pt found to be in septic 

shock.





#Septic shock likely 2/2 CNS infection


-pt clinically appears improved; less lethargic


-off pressors; transferred to floor


-CSF: suggests viral etiology; lymphocyte predominant. with elev protein


-Continue rocephin 2g IVPB qd, vanco 750 mg IVPB qd (Day3)


-allergic to ampicillin- angioedema


-Started on acyclovir 500mg IVPB q8h (Day3). Follow renal fnc while on, d/t 

crystallization


-brain MRI: without pathology ; likely artifact


-F/u CSF cx, blood cx (-) , ucx- pending


-HIV, TB (-)


-Follow HSV titers-may take 1 wk to return 





#R/o CVA


-Head CT: r/o acute vs. subacute frontal infarct


-Brain MRI- w/artifact, no abscess visualized 


-carotid doppler: without hemodynamic sig stenosis 


-speech and swallow eval when able


-Neuro on board- Dr. Stoner


-F/u B12, RPR, HIV testing





#Diarrhea likely 2/2 abx


-F/u stool cx


-C.diff toxin, ag testing





#Angioedema likely 2/2 abx 


-Likely from ampicillin


-monitor airway


-received IV medrol 125 x1, Benadryl


-continue benadryl 25mg IVP q6h


-Prednisone 40mg PO qd has been d/c 


-Famotidine 20mg IVPB BID 





#Hypernatremia-improved


-continue hydration with NS as pt recovering from septic shock


-avoid overcorrection; no more than 6-8 mEq over 24hrs





#Bladder distension-improved


-hurtado removed today (6/1)





#Tropinemia likely 2/2 demand from septic shock


-has peaked at 0.30


-0.17> 0.30>0.21


-Cardio on board: Dr. Conteh


-ECHO without evidence vegetations





#Hypothyroidism


-currently without complaint 


-as per son, pt without current PCP, however was seen at [address] [place] [address] multiple 

yrs prior 


-as per pharmacy x2, currently not on levothyroxine. Confirmed by son


-TSH 1.48


-has been d/c





#F/E/N


IV 1/2 NS 75 cc/hr - 1 bag . Currently on  cc/hr 


Continue to follow lytes


regular diet





#PPX


DVT: hep 5000 U TID 





#Dispo


has been transferred out of ICU to floor


clinically improved 





Visit type





- Emergency Visit


Emergency Visit: No





- New Patient


This patient is new to me today: No





- Critical Care


Critical Care patient: No





- Discharge Referral


Referred to [facility] No

## 2018-06-01 NOTE — PN
Progress Note, Physician


Chief Complaint: 





ID








Alert no complaints looks much better and more alert vs admission





Vancom Ceftriaxone  Acyclovir





- Current Medication List


Current Medications: 


Active Medications





Diphenhydramine HCl (Benadryl Injection -)  25 mg IVPUSH Q6H PRN


   PRN Reason: FOR ITCHING


Heparin Sodium (Porcine) (Heparin -)  5,000 unit SQ TID [facility]


   Last Admin: 06/01/18 06:39 Dose:  5,000 unit


Sodium Chloride (Normal Saline -)  1,000 mls @ 125 mls/hr IV ASDIR [facility]


Ceftriaxone Sodium 2 gm/ (Dextrose)  100 mls @ 200 mls/hr IVPB BID@0615,1815 [facility]

; Protocol


   Last Admin: 06/01/18 06:37 Dose:  200 mls/hr


Famotidine/Sodium Chloride (Pepcid 20 Mg Premixed Ivpb -)  20 mg in 50 mls @ 

100 mls/hr IVPB BID [facility]


   Last Admin: 05/31/18 21:48 Dose:  100 mls/hr


Vancomycin HCl 750 mg/ (Dextrose)  250 mls @ 250 mls/hr IVPB BID@0900,2100 [facility]


   Last Admin: 05/31/18 22:52 Dose:  250 mls/hr


Acyclovir 500 mg/ Dextrose  110 mls @ 110 mls/hr IVPB Q12H [facility]


   Last Admin: 06/01/18 05:27 Dose:  110 mls/hr


Ondansetron HCl (Zofran Injection)  4 mg IVPUSH Q6H PRN


   PRN Reason: NAUSEA AND/OR VOMITING


Pantoprazole Sodium (Protonix -)  40 mg PO DAILY [facility]


Potassium Phos/Sodium Phos (Phos-Nak Packet -)  1 packet PO BID [facility]


   Last Admin: 05/31/18 21:49 Dose:  1 packet


Prednisone (Deltasone -)  40 mg PO DAILY [facility]











- Objective


Vital Signs: 


 Vital Signs











Temperature  98.5 F   06/01/18 06:00


 


Pulse Rate  89   06/01/18 06:00


 


Respiratory Rate  20   06/01/18 06:00


 


Blood Pressure  137/67   06/01/18 06:00


 


O2 Sat by Pulse Oximetry (%)  100   05/31/18 21:00











Constitutional: Yes: Well Nourished, No Distress


Eyes: Yes: WNL, Conjunctiva Clear


HENT: Yes: WNL, Atraumatic


Neck: Yes: WNL, Supple


Cardiovascular: Yes: Regular Rate and Rhythm, S1, S2, Other.  No: Murmur


Respiratory: Yes: WNL, Regular, CTA Bilaterally


Gastrointestinal: Yes: Soft.  No: Tenderness


Edema: No


Labs: 


 INR, PTT











INR  1.23  (0.82-1.09)  H  05/29/18  16:40    














Problem List





- Problems


(1) Septic shock


Code(s): A41.9 - SEPSIS, UNSPECIFIED ORGANISM; R65.21 - SEVERE SEPSIS WITH 

SEPTIC SHOCK   





Assessment/Plan





Microbiology





05/30/18 14:00   Cerebral Spinal Fluid - Lumbar Puncture   Gram Stain - Final


05/29/18 16:48   Urine - Urine Scott   Urine Culture - Final


                              NO GROWTH OBTAINED


05/30/18 14:16   Nasopharyngeal Swab   Respiratory Virus (PCR) - Preliminary


05/30/18 14:00   Cerebral Spinal Fluid - Lumbar Puncture   Viral Culture - 

Preliminary


05/30/18 14:00   Cerebral Spinal Fluid - Lumbar Puncture   Mycobacteria (PCR) - 

Preliminary


05/30/18 14:00   Cerebral Spinal Fluid - Lumbar Puncture   Cryptococcal Antigen 

- Preliminary


05/30/18 14:00   Cerebral Spinal Fluid - Lumbar Puncture   CSF Culture - 

Preliminary


05/29/18 16:48   Blood - Peripheral Venous   Blood Culture - Preliminary


                              NO GROWTH OBTAINED AFTER 48 HOURS, INCUBATION TO 

CONTINUE


                              FOR 3 DAYS.


05/29/18 16:48   Blood - Peripheral Venous   Blood Culture - Preliminary


                              NO GROWTH OBTAINED AFTER 48 HOURS, INCUBATION TO 

CONTINUE


                              FOR 3 DAYS.





 Laboratory Tests











  05/30/18 05/30/18 05/30/18





  14:00 14:00 18:30


 


WBC   


 


RBC   


 


Hgb   


 


Hct   


 


Plt Count   


 


BUN   


 


Creatinine   


 


Creat Clearance w eGFR   


 


HSV I DNA Quant (PCR)   Pending 


 


HSV II DNA Quant (PCR)   Pending 


 


H.influenzae Type B Ag  Pending  


 


N. meningitidis Antigen  Pending  


 


Group B Strep Antigen  Pending  


 


S. pneumoniae Antigen  Pending  


 


TB Test (QFT)    Pending














  05/31/18 06/01/18 06/01/18





  05:30 06:40 06:40


 


WBC  4.9  D  Pending 


 


RBC  3.58 L  


 


Hgb   Pending 


 


Hct  32.1 L  Pending 


 


Plt Count  148  D  


 


BUN    Pending


 


Creatinine    Pending


 


Creat Clearance w eGFR    Pending


 


HSV I DNA Quant (PCR)   


 


HSV II DNA Quant (PCR)   


 


H.influenzae Type B Ag   


 


N. meningitidis Antigen   


 


Group B Strep Antigen   


 


S. pneumoniae Antigen   


 


TB Test (QFT)   








Assesment Abnormal CSF ? Viral infection  Without the prior history of having 

had meningitis in the past which she denies the likelihood of a a Herpetic 

meningitis would seem quite rare as compared to Cox[place]ie or ECHO. IN that case 

HSV meningitis would get better on its own.  If she has HSV encephalitis she 

would need treatment Acyclovir.  Unfortunately the   PCR HSV in my experience 

takes over a week to come back but we can check with lab.  An MRI would be 

important because if neg might argue for stopping Acyclovir sooner.  Still 

cannot understand why she presented as septic shock given the CSF findings.








Advise


Stop Vanco now and Ceftriaxone if culture negative


Check HSV PCR when available  


Stop Steroids





Ruthy FRANKLIN

## 2018-06-02 VITALS — DIASTOLIC BLOOD PRESSURE: 83 MMHG | TEMPERATURE: 98.4 F | HEART RATE: 90 BPM | SYSTOLIC BLOOD PRESSURE: 152 MMHG

## 2018-06-02 LAB
ANION GAP SERPL CALC-SCNC: 7 MMOL/L (ref 8–16)
BASOPHILS # BLD: 0.1 % (ref 0–2)
BUN SERPL-MCNC: 12 MG/DL (ref 7–18)
CALCIUM SERPL-MCNC: 8.2 MG/DL (ref 8.5–10.1)
CHLORIDE SERPL-SCNC: 122 MMOL/L (ref 98–107)
CO2 SERPL-SCNC: 25 MMOL/L (ref 21–32)
CREAT SERPL-MCNC: 1 MG/DL (ref 0.55–1.02)
DEPRECATED RDW RBC AUTO: 14.7 % (ref 11.6–15.6)
EOSINOPHIL # BLD: 0.2 % (ref 0–4.5)
GLUCOSE SERPL-MCNC: 106 MG/DL (ref 74–106)
HCT VFR BLD CALC: 30.9 % (ref 32.4–45.2)
HGB BLD-MCNC: 10.4 GM/DL (ref 10.7–15.3)
LYMPHOCYTES # BLD: 19.5 % (ref 8–40)
MAGNESIUM SERPL-MCNC: 2 MG/DL (ref 1.8–2.4)
MCH RBC QN AUTO: 30.9 PG (ref 25.7–33.7)
MCHC RBC AUTO-ENTMCNC: 33.7 G/DL (ref 32–36)
MCV RBC: 91.6 FL (ref 80–96)
MONOCYTES # BLD AUTO: 9.4 % (ref 3.8–10.2)
NEUTROPHILS # BLD: 70.8 % (ref 42.8–82.8)
PHOSPHATE SERPL-MCNC: 2.5 MG/DL (ref 2.5–4.9)
PLATELET # BLD AUTO: 180 K/MM3 (ref 134–434)
PMV BLD: 7.9 FL (ref 7.5–11.1)
POTASSIUM SERPLBLD-SCNC: 3.7 MMOL/L (ref 3.5–5.1)
RBC # BLD AUTO: 3.37 M/MM3 (ref 3.6–5.2)
SODIUM SERPL-SCNC: 154 MMOL/L (ref 136–145)
WBC # BLD AUTO: 4.7 K/MM3 (ref 4–10)

## 2018-06-02 RX ADMIN — FAMOTIDINE SCH MLS/HR: 20 INJECTION, SOLUTION INTRAVENOUS at 10:25

## 2018-06-02 RX ADMIN — POTASSIUM & SODIUM PHOSPHATES POWDER PACK 280-160-250 MG SCH PACKET: 280-160-250 PACK at 10:25

## 2018-06-02 RX ADMIN — ACYCLOVIR SODIUM SCH MLS/HR: 50 INJECTION, SOLUTION INTRAVENOUS at 05:55

## 2018-06-02 RX ADMIN — PANTOPRAZOLE SODIUM SCH MG: 40 TABLET, DELAYED RELEASE ORAL at 10:25

## 2018-06-02 RX ADMIN — HEPARIN SODIUM SCH UNIT: 5000 INJECTION, SOLUTION INTRAVENOUS; SUBCUTANEOUS at 15:04

## 2018-06-02 RX ADMIN — HEPARIN SODIUM SCH UNIT: 5000 INJECTION, SOLUTION INTRAVENOUS; SUBCUTANEOUS at 05:50

## 2018-06-02 NOTE — PN
Progress Note (short form)





- Note


Progress Note: 





ID








Discussed with teaching service patient doing well





Still on acyclovir


 Selected Entries











  06/02/18 06/02/18





  05:57 06:21


 


Temperature 98.3 F 


 


Pulse Rate  96 H


 


Respiratory  21





Rate  


 


Blood Pressure  144/74








 Laboratory Tests











  05/30/18 05/30/18 05/30/18





  14:00 18:30 18:30


 


BUN   


 


Creatinine   


 


Creat Clearance w eGFR   


 


HSV I DNA Quant (PCR)  Negative  


 


HSV II DNA Quant (PCR)  Negative  


 


HIV 1&2 Antibody Screen   Negative 


 


HIV P24 Antigen   Negative 


 


TB Test (QFT)    Negative














  06/01/18 06/02/18





  06:40 07:30


 


BUN  13  Pending


 


Creatinine  1.1 H  Pending


 


Creat Clearance w eGFR  50.01 


 


HSV I DNA Quant (PCR)  


 


HSV II DNA Quant (PCR)  


 


HIV 1&2 Antibody Screen  


 


HIV P24 Antigen  


 


TB Test (QFT)  








Assessment   At this point can stop Acyclovir Discharge p;rashid





Problem List





- Problems


(1) Septic shock


Code(s): A41.9 - SEPSIS, UNSPECIFIED ORGANISM; R65.21 - SEVERE SEPSIS WITH 

SEPTIC SHOCK

## 2018-06-02 NOTE — PN
Teaching Attending Note


Name of Resident: Madeleine Palacios





ATTENDING PHYSICIAN STATEMENT





I saw and evaluated the patient.


I reviewed the resident's note and discussed the case with the resident.


I agree with the resident's findings and plan as documented.








SUBJECTIVE:


had 5 watery BMs yesterday and last night , but non this am. no abd pain , no 

fever . diarrhea had stopped.  





OBJECTIVE:


awake, alert ,happy and cooperative 


no facial edema , no oropharynx edema or tongue edema  


CV: RRR, no MRG 


Lungs: CTAB 


Ext: no edema or erythema. no rash. 


Neuro: EOMI, round equal pupils , reactive to light. no facial droop. uvula and 

tongue at mid line. facial sensation to light touch NL.


 Strength : shoulder shrug 5/5 b/l. biceps /triceps 5/5 . not cooperative with 

deltoid exam. nl hand  b/l 


LE : hip flexion 5/5 , ankle dorsiflexion and plantar flexion 5/5 . 5/5 knee 

flexion and extension 


Nl sensation to light touch 














ASSESSMENT AND PLAN:





63 y/o lady with h/o CVA who presented with AMS and was found to have fevers 

and  shock requiring pressors 





1- Septic Shock due to vial meningitis:resolved 


-dc IVF . po hydration 


- blood cx no growth after 72 hours 





2- AMS, encephalopathy due to viral  meningitis. 


CSF cx neg. HSV Neg . 


other tests on CSF are still pending 


Mental status is back to Normal 


- dc acyclovir . d/w ID 


- no need for Abx 








3-Angioedema : resolved. 


- cont benadryl and H2 blockers for a total of 1 week


- no steroids at this point 





4- diarrhea : resolved. probably due to Abx. no leukocytosis or abd pain or 

fever , to suggest c diff.  stool sample last night was not enough to test for 

c diff 


 





5- Hypernatremia: cont po hydration 





dispo : DC home .

## 2018-06-02 NOTE — DS
Physical Exam: 


SUBJECTIVE: Patient seen and examined by me at bedside.  Overnight events noted 

for  episodes of diarrhea, as per patient and patients son.  Patient however 

reports feeling much better and has not had an episode of diarrhea today.  

Otherwise, patient denies fever, chills, nausea, vomiting, abdominal pain, 

chest pain, palpitations, headaches, acute vision changes








OBJECTIVE:





 Vital Signs











 Period  Temp  Pulse  Resp  BP Sys/Ariza  Pulse Ox


 


 Last 24 Hr  97.0 F-98.6 F  90-96  20-21  104-152/57-83  








PHYSICAL EXAM





GENERAL: The patient is in good spirits. Happy and with family. awake, alert, 

and fully oriented, in no acute distress.


HEAD: Normal with no signs of trauma.


EYES: PERRL, extraocular movements intact, sclera anicteric, conjunctiva clear. 

No ptosis. Without facial swelling


ENT: oropharynx clear. +poor dentition


LUNGS: Breath sounds equal, clear to auscultation bilaterally, no wheezes, no 

crackles, no accessory m usage


HEART: Regular rate and rhythm, S1, S2 without murmur, rub or gallop.


ABDOMEN: Soft, Non tender, nondistended, normoactive bowel sounds, mild guarding


EXTREMITIES: 2+ pt pulses, warm, well-perfused, no edema. 


NEUROLOGICAL: Cranial nerves II through XII intact. Motor strength 4/5 upper 

and lower extremities. sensation intact. cerebellar function intact


PSYCH: Positive mood, normal affect.


SKIN: Warm, dry, normal turgor








LABS


 Laboratory Results - last 24 hr











  05/30/18 06/02/18 06/02/18





  14:00 07:30 07:30


 


WBC   4.7 


 


RBC   3.37 L 


 


Hgb   10.4 L D 


 


Hct   30.9 L D 


 


MCV   91.6 


 


MCH   30.9 


 


MCHC   33.7 


 


RDW   14.7 


 


Plt Count   180  D 


 


MPV   7.9 


 


Neutrophils %   70.8  D 


 


Lymphocytes %   19.5  D 


 


Monocytes %   9.4  D 


 


Eosinophils %   0.2  D 


 


Basophils %   0.1 


 


Nucleated RBC %   0 


 


Sodium    154 H


 


Potassium    3.7


 


Chloride    122 H


 


Carbon Dioxide    25


 


Anion Gap    7 L


 


BUN    12


 


Creatinine    1.0


 


Random Glucose    106


 


Calcium    8.2 L


 


Phosphorus    2.5


 


Magnesium    2.0


 


HSV I DNA Quant (PCR)  Negative  


 


HSV II DNA Quant (PCR)  Negative  





 


 Microbiology





05/29/18 16:48   Blood - Peripheral Venous   Blood Culture - Preliminary


                            NO GROWTH OBTAINED AFTER 96 HOURS, INCUBATION TO 

CONTINUE


                            FOR 1 DAYS.


05/29/18 16:48   Blood - Peripheral Venous   Blood Culture - Preliminary


                            NO GROWTH OBTAINED AFTER 96 HOURS, INCUBATION TO 

CONTINUE


                            FOR 1 DAYS.


05/30/18 14:00   Cerebral Spinal Fluid - Lumbar Puncture   Gram Stain - Final


05/30/18 14:00   Cerebral Spinal Fluid - Lumbar Puncture   CSF Culture - Final


05/29/18 16:48   Urine - Urine Scott   Urine Culture - Final


                            NO GROWTH OBTAINED


05/30/18 14:16   Nasopharyngeal Swab   Respiratory Virus (PCR) - Preliminary


05/30/18 14:00   Cerebral Spinal Fluid - Lumbar Puncture   Mycobacteria (PCR) - 

Preliminary


05/30/18 14:00   Cerebral Spinal Fluid - Lumbar Puncture   Cryptococcal Antigen 

- Preliminary


05/30/18 14:00   Cerebral Spinal Fluid - Lumbar Puncture   Viral Culture - 

Preliminary








Imaging





-5/29/18: CXR (series of three): line placement; image 1: no acute pathology. 

repeat: without pneumothorax. additional imaging: no obvious infiltrate or 

pleural effusion, minimal pleural thickening along the horizontal fissure





-5/30/18: Abd/Pelvis CT w/o contrast: small, b/l pleural effusions with 

atelectatic changes involving the lower lobes. additional areas of atelectasis 

in both lungs with scattered nodularity, R apical pleural thickening and faint 

groundglass opacification. these changes may be chronic in nature. no evidence 

of acute consolidation. trace ascites in lower abdomen. distended urinary 

bladder. no bony abnormalities.





-5/30/18: Head CT: moderate-sized faint low-attenuation density in the R 

frontal lobe at the level of corona radiata and centrum semiovale is highly 

suspicious for an acute/subacute infarct for which follow up CT or MRI of brain 

is recommended. no acute intracranial hemorrhage is seen. moderate soft tissue 

swelling over left side of head.





-5/30/18: GB U/S: technically limited exam 2/2 portable technique and body 

habitus. moderate GB distension is presumably physiologic. no evidence of 

cholelithiasis. no definite evidence of acute cholecystitis. trace 

pericholecystic fluid is nonspecific. no definite intrahepatic or extrahepatic 

bile duct dilation.





-5/30/18: ECHO: EF 69.1%, normal LV EF, moderate TR, RVSP elevated at 40-50 mmHg

, trace to mild MR, mild AR.





-5/30/18: Carotid doppler study: right side could not be visualized d/t 

overlying bandage. moderate intimal thickening in the mid and distal common 

casrotid artery up to the bifurcation, without evidence of hemodynamically 

significant stenosis bilaterally.





-6/1/18: Brain MRI/Neck MRA: mild to moderate volume loss and very minimal 

periventricular chronic microvascular ischemic disease changes. no acute 

intracranial pathology or abnormal enhancement are identified. previously 

described low attenuation density in the R MCA territory represents an artifact

, in retrospect. MRA neck: vertebral arteries are unremarkable. common carotid 

and bifurcation unremarkable without evidence of stenosis b/l








HOSPITAL COURSE:


Patient is a 62 year old female with no significant PMHx who was brought to the 

hospital after her son found her unresponsive and febrile at home.  When 

patient arrived she was found to be in Septic shock and was being monitored in 

the ICU on pressors.  Patient immediately responded on pressors and was taken 

off of them less than 12 hours later.  Patient continued to have Fevers for 24 

hours and was started on Vancomycin, Ceftriaxone for three days.  Patient was 

also started on Acyclovir. MRI as well as Head CT were negative. ECHO revealed 

no vegetation. LP was done at bedside and CSF fluids revealed viral meningitis.

  Patient was then taken off the antibiotics and remained on Acyclovir until 

HSV Csf results returned.  Patient's blood and urine cultures were negative.  

THroughout the course patient was given Ampicillin with an episode of 

angioedema.  Patient was started on Benadryl and famitidine with resolution of 

symptoms.  HSV cultures returned today (06/02/18) and patient was taken off 

acyclovir.  However, last night patient experienced multiple episodes of 

diarrhea and a stool culture was taken.  Gave thorough instructions upon 

discharge on following up within a week at [facility] for remaining CSF results 

and well as repeat CBC and BMP. Patient remained stable and ready for discharge.





Date of Admission:05/29/18





Date of Discharge: 06/02/18











Minutes to complete discharge: 45





Discharge Summary


Reason For Visit: SEPTIC SHOCK


Current Active Problems





Septic shock (Acute)


Viral meningitis, unspecified (Acute)








Condition: Improved





- Instructions


Diet, Activity, Other Instructions: 


You were admitted to the hospital because you were found to have a viral 

infection of the brain.  You were in the ICU for a couple of days and required 

medications to keep your blood pressure up.  Throughout your hospital stay you 

had fluid sample from your spine taken to check and see what virus or bacteria 

is causing this.  As of today, the results are negative but there are still 

more pending.  You improved and do not need anymore antibiotics. 


-You were given an antibiotic called Ampicillin and you had an allergic 

reaction to it.  


-Please have it in your personal records that you are highly allergic to 

ampicillin.





FOLLOW UP:


-Please follow up at [facility] in one week for the rest of your blood 

work and CSF results (Lyme, [place] n[place], N. meningitis, H. Influenza type B, 

Group B strep, S. Pnemoniae) .  Please call [phone] and ask the  

to transfer you to the [facility]  Please make an Appointment for next 

week.





-Please Follow up with Neurologist, Dr. Stoner, within two weeks for a follow 

up on the headaches you've been experiencing and to follow up on the pending 

labs . 





-I Will be sending a prescription of CBC and BMP to be done at any lab or here 

at [facility] in one week. 





-We took some stool cultures and are still pending.  We will call you when the 

results return.  If the results are positive, we will prescribe you antibiotics.








MEDICATIONS:


-Avoid taking Ibuprofen or any NSAIDS as it can damage your kidneys.  Take 

Tylenol as needed for headaches.  You may take up to 3000mg of Tylenol.   





-Please drink plenty of water








If you experience fevers above 103.0 F, shortness of breath, swelling of the 

face, return to the emergency department.  





Referrals: 


Navarro Stoner MD [Staff Physician] - 


Disposition: HOME





- Home Medications


Comprehensive Discharge Medication List: 


Ambulatory Orders





Acetaminophen [Tylenol] 325 mg PO Q6H PRN #10 tablet 06/02/18 


Miscellaneous Drug Not In Syst [Outpatient Lab Test] 1 each  ASDIR #1 misc 06/ 02/18 








This patient is new to me today: Yes


Date on this admission: 06/03/18


Emergency Visit: Yes


ED Registration Date: 05/29/18


Care time: The patient presented to the Emergency Department on the above date 

and was hospitalized for further evaluation of their emergent condition.


Critical Care patient: No





- Discharge Referral


Referred to [facility] No

## 2018-06-02 NOTE — PN
Physical Exam: 


SUBJECTIVE: Patient seen and examined








OBJECTIVE:





 Vital Signs











 Period  Temp  Pulse  Resp  BP Sys/Ariza  Pulse Ox


 


 Last 24 Hr  97.0 F-98.6 F  82-96  20-21  104-145/57-75  100











GENERAL: The patient is awake, alert, and fully oriented, in no acute distress.


HEAD: Normal with no signs of trauma.


EYES: PERRL, extraocular movements intact, sclera anicteric, conjunctiva clear. 

No ptosis. 


ENT: Ears normal, nares patent, oropharynx clear without exudates, moist mucous 


membranes.


NECK: Trachea midline, full range of motion, supple. 


LUNGS: Breath sounds equal, clear to auscultation bilaterally, no wheezes, no 

crackles, no 


accessory muscle use. 


HEART: Regular rate and rhythm, S1, S2 without murmur, rub or gallop.


ABDOMEN: Soft, nontender, nondistended, normoactive bowel sounds, no guarding, 

no 


rebound, no hepatosplenomegaly, no masses.


EXTREMITIES: 2+ pulses, warm, well-perfused, no edema. 


NEUROLOGICAL: Cranial nerves II through XII grossly intact. Normal speech, gait 

not 


observed.


PSYCH: Normal mood, normal affect.


SKIN: Warm, dry, normal turgor, no rashes or lesions noted














 Laboratory Results - last 24 hr











  05/30/18 05/30/18 05/30/18





  14:00 14:00 18:30


 


WBC   


 


RBC   


 


Hgb   


 


Hct   


 


MCV   


 


MCH   


 


MCHC   


 


RDW   


 


Plt Count   


 


MPV   


 


Neutrophils %   


 


Lymphocytes %   


 


Monocytes %   


 


Eosinophils %   


 


Basophils %   


 


Nucleated RBC %   


 


Sodium   


 


Potassium   


 


Chloride   


 


Carbon Dioxide   


 


Anion Gap   


 


BUN   


 


Creatinine   


 


Random Glucose   


 


Phosphorus   


 


Magnesium   


 


Creatine Kinase Index   


 


CK-MB (CK-2)   


 


CSF VDRL  Non reactive  


 


HSV I DNA Quant (PCR)   Negative 


 


HSV II DNA Quant (PCR)   Negative 


 


TB Test (QFT)    Negative














  06/01/18 06/02/18 06/02/18





  06:40 07:30 07:30


 


WBC   4.7 


 


RBC   3.37 L 


 


Hgb   10.4 L D 


 


Hct   30.9 L D 


 


MCV   91.6 


 


MCH   30.9 


 


MCHC   33.7 


 


RDW   14.7 


 


Plt Count   180  D 


 


MPV   7.9 


 


Neutrophils %   70.8  D 


 


Lymphocytes %   19.5  D 


 


Monocytes %   9.4  D 


 


Eosinophils %   0.2  D 


 


Basophils %   0.1 


 


Nucleated RBC %   0 


 


Sodium    154 H


 


Potassium    3.7


 


Chloride    122 H


 


Carbon Dioxide    25


 


Anion Gap    7 L


 


BUN    12


 


Creatinine    1.0


 


Random Glucose    106


 


Phosphorus    2.5


 


Magnesium    2.0


 


Creatine Kinase Index  4.0  


 


CK-MB (CK-2)  6.789 H  


 


CSF VDRL   


 


HSV I DNA Quant (PCR)   


 


HSV II DNA Quant (PCR)   


 


TB Test (QFT)   








Active Medications











Generic Name Dose Route Start Last Admin





  Trade Name Freq  PRN Reason Stop Dose Admin


 


Heparin Sodium (Porcine)  5,000 unit  05/31/18 14:00  06/02/18 05:50





  Heparin -  SQ   5,000 unit





  TID MARILEE   Administration





     





     





     





     


 


Sodium Chloride  1,000 mls @ 125 mls/hr  05/31/18 12:17  06/01/18 12:37





  Normal Saline -  IV   125 mls/hr





  ASDIR MARILEE   Administration





     





     





     





     


 


Famotidine/Sodium Chloride  20 mg in 50 mls @ 100 mls/hr  05/31/18 22:00  06/01/ 18 21:01





  Pepcid 20 Mg Premixed Ivpb -  IVPB   100 mls/hr





  BID MARILEE   Administration





     





     





     





     


 


Ondansetron HCl  4 mg  05/31/18 20:38  





  Zofran Injection  IVPUSH   





  Q6H PRN   





  NAUSEA AND/OR VOMITING   





     





     





     


 


Pantoprazole Sodium  40 mg  06/01/18 10:00  06/01/18 09:49





  Protonix -  PO   40 mg





  DAILY MARILEE   Administration





     





     





     





     


 


Potassium Phos/Sodium Phos  1 packet  05/31/18 14:00  06/01/18 21:01





  Phos-Nak Packet -  PO   1 packet





  BID MARILEE   Administration





     





     





     





     











ASSESSMENT/PLAN:

## 2018-08-24 ENCOUNTER — HOSPITAL ENCOUNTER (EMERGENCY)
Dept: HOSPITAL 74 - JER | Age: 63
Discharge: HOME | End: 2018-08-24
Payer: COMMERCIAL

## 2018-08-24 VITALS — HEART RATE: 65 BPM | DIASTOLIC BLOOD PRESSURE: 50 MMHG | SYSTOLIC BLOOD PRESSURE: 108 MMHG

## 2018-08-24 VITALS — TEMPERATURE: 98.1 F

## 2018-08-24 VITALS — BODY MASS INDEX: 22.6 KG/M2

## 2018-08-24 DIAGNOSIS — Z86.39: ICD-10-CM

## 2018-08-24 DIAGNOSIS — Z86.73: ICD-10-CM

## 2018-08-24 DIAGNOSIS — B37.89: Primary | ICD-10-CM

## 2018-08-24 NOTE — PDOC
History of Present Illness





- General


Chief Complaint: Pain


Stated Complaint: BREAST PAIN ON BOTH/PUSS DISCHARGE


Time Seen by Provider: 08/24/18 15:26


History Source: Family


Exam Limitations: Language Barrier





- History of Present Illness


Initial Comments: 





08/24/18 16:09


62F with PMH of CVA and hyponatremia recently discharged 10 days ago after 

being admitted for hyponatremia. Patient was worked up and discharged on 

hydrocortisone and supposed to follow up with Dr. Jefferson from endocrinology. 

She presents with family members for a 3 day history of bilateral breast pain 

with discharge. Per the family she has been experiencing pain with white 

discharge. She denies nausea vomiting feevr chiis chest pain or shortness of 

breath. She denies redness but does endorse some itching. 








Past History





- Past Medical History


Allergies/Adverse Reactions: 


 Allergies











Allergy/AdvReac Type Severity Reaction Status Date / Time


 


ampicillin Allergy  Swelling Verified 08/24/18 15:07


 


Penicillins Allergy  Swelling Verified 08/24/18 15:07











Home Medications: 


Ambulatory Orders





Hydrocortisone 20 mg PO DAILY #14 tablet 08/14/18 


Nystatin Ointment [Mycostatin Ointment -] 1 applic TP BID #1 tube 08/24/18 








CVA: Yes (H/O LACUNAR INFARCT)


COPD: No


Thyroid Disease: Yes (has not taken medication >7yrs)





- Suicide/Smoking/Psychosocial Hx


Smoking Status: No


Smoking History: Never smoked


Have you smoked in the past 12 months: No


Number of Cigarettes Smoked Daily: 0


Hx Alcohol Use: No


Drug/Substance Use Hx: No


Substance Use Type: None





**Review of Systems





- Review of Systems


Able to Perform ROS?: Yes (thru son)


Is the patient limited English proficient: Yes


Constitutional: No: Symptoms Reported, See HPI, Chills, Diaphoresis, Fever, 

Loss of Appetite, Malaise, Night Sweats, Weakness, Weight Stable, Unintentional 

Wgt. Loss, Unexplained wgt Loss, Other


HEENTM: No: Symptoms Reported, See HPI, Eye Pain, Blurred Vision, Tearing, 

Recent change in vision, Double Vision, Cataracts, Ear Pain, Ocular Prothesis, 

Ear Discharge, Nose Pain, Nose Congestion, Tinnitus, Nose Bleeding, Hearing Loss

, Throat Pain, Throat Swelling, Mouth Pain, Dental Problems, Difficulty 

Swallowing, Mouth Swelling, Other


Respiratory: No: Symptoms reported, See HPI, Cough, Orthopnea, Shortness of 

Breath, SOB with Exertion, SOB at Rest, Stridor, Wheezing, Productive cough, 

Hemoptysis, Other


Cardiac (ROS): No: Symptoms Reported, See HPI, Chest Pain, Edema, Irregular 

Heart Rate, Lightheadedness, Palpitations, Syncope, Chest Tightness, Other


ABD/GI: No: Symptoms Reported, See HPI, Abdominal Distended, Abd. Pain w/ 

defecation, Blood Streaked Bowels, Constipated, Diarrhea, Difficulty Swallowing

, Nausea, Poor Appetite, Poor Fluid Intake, Rectal Bleeding, Vomiting, 

Indigestion, Abdominal cramping, Tarry Stools, Other


: No: Symptoms Reported, See HPI, Burning, Dysuria, Discharge, Frequency, 

Flank Pain, Hematuria, Incontinence, Pain, Urgency, Testicular Mass, Testicular 

Swelling, Lesions, Testicular Pain, Other


Musculoskeletal: No: Symptoms Reported, See HPI, Back Pain, Gout, Joint Pain, 

Joint Swelling, Muscle Pain, Muscle Weakness, Neck Pain, Joint Stiffness, Other


Integumentary: No: Symptoms Reported, See HPI, Bruising, Change in Color, 

Change in Hair/Nails, Dryness, Erythema, Flushing, Lesions, Lumps, Pallor, 

Pruritus, Rash, Sweating, Other


Neurological: No: Symptoms reported, See HPI, Headache, Numbness, Paresthesia, 

Pre-Existing Deficit, Seizure, Tingling, Tremors, Weakness, Unsteady Gait, 

Ataxia, Dizziness, Other


Psychiatric: No: Anxiety, Depression, Frequent Crying, Stressors, Sleep Pattern 

Change, Emotional Problems, Mood Swings, Change in Appetite, Other


Endocrine: Yes: Other (Patient endorses bilateral breast pain and discharge)


Hematologic/Lymphatic: No: Symptoms Reported, See HPI, Anemia, Blood Clots, 

Easy Bleeding, Easy Bruising, Bleeding Diathesis, Lymph Node Abnormalities, 

Swollen Glands, Other





*Physical Exam





- Vital Signs


 Last Vital Signs











Temp Pulse Resp BP Pulse Ox


 


 98.1 F   73   17   114/71   100 


 


 08/24/18 15:07  08/24/18 15:07  08/24/18 15:07  08/24/18 15:07  08/24/18 15:07














- Physical Exam


General Appearance: Yes: Nourished, Appropriately Dressed.  No: Apparent 

Distress


Neck: positive: Trachea midline, Supple


Respiratory/Chest: positive: Lungs Clear, Normal Breath Sounds, Other (Breast 

Exam: Both nipples are retracted. No erythema or rash. Upon squeezing both 

nipples there seems to be a white substance that can not be scraped off. Both 

breasts are slightly tender to palpation. ).  negative: Respiratory Distress, 

Accessory Muscle Use


Cardiovascular: positive: Regular Rhythm, Regular Rate, S1, S2


Gastrointestinal/Abdominal: positive: Soft.  negative: Tender


Musculoskeletal: negative: CVA Tenderness


Extremity: positive: Normal Capillary Refill


Integumentary: positive: Dry, Warm


Neurologic: positive: Alert





Medical Decision Making





- Medical Decision Making





08/24/18 16:36


62F with PMH of hypohtyoidism CVA and hyponatremia presents to the ER with B/L 

mastalgia with possible thrush on the nipples given recent steroid use. 


Family would like mammogram to be done today. Dr. Baxter from endocrinology 

called and states that this is not an emergency and patient can be discharged 

with follow up to see him next week. He recommends continuation of the steroids 

and he would like to see her in the office on tuesday 8/28/18.


Will give nystatin ointment for nipples 





*DC/Admit/Observation/Transfer


Diagnosis at time of Disposition: 


 Candidiasis of breast, Mastitis in female








- Discharge Dispostion


Disposition: HOME


Condition at time of disposition: Stable


Decision to Admit order: No





- Prescriptions


Prescriptions: 


Nystatin Ointment [Mycostatin Ointment -] 1 applic TP BID #1 tube





- Referrals


Referrals: 


Glo Koch MD [Primary Care Provider] - Call tomorrow


Lisa Baxter MD [Staff Physician] - 3 days (go to see on tuesday. Call for 

appointment time.)


Carlos Fletcher MD [Staff Physician] -  (call for appointment if needed)





- Patient Instructions


Printed Discharge Instructions:  DI for Breast Pain (Mastalgia)


Additional Instructions: 


you were seen here for breast pain and discharge. If your symptoms get worse 

and you start getting redness and fevers and/or chills then come back to the 

emergency room or go to the nearest emergency room. You must follow up with an 

endocrinologist to manage the steroids you are on and the low sodium. Please 

make appointment for Tuesday. Go see your primary care doctor as soon as 

possible. I sent a prescription to [facility] on [address]. Put the ointment 

on your breast twice a day. I also gave you an appointment for a breast surgeon 

Dr. Fletcher in case you need to see a breast surgeon. It was a pleasure taking 

care for you. 





fuiste vista aqu por dolor de pecho y secrecin. Si cr sntomas empeoran y 

comienza a tener enrojecimiento y fiebre y / o escalofros, vuelva a la zonia de 

emergencias o vaya a la zonia de emergencias ms cercana. Debe hacer un 

seguimiento con un endocrinlogo para controlar los esteroides en los que est 

y el bajo contenido de sodio. Por favor saroj mamta antonio para el deven. Vaya a 

rui a stevens mdico de atencin primaria lo antes posible. Envi mamta receta a 

[facility] en [place] [address] Coloque la pomada en cr senos dos veces al 

da. Lisbet le di mamta antonio para un cirujano de mama, el Dr. Fletcher, en xavier 

de que necesite rui a un cirujano de mama. Fue un placer cuidar de ti.





- Post Discharge Activity

## 2018-08-24 NOTE — PDOC
Attending Attestation





- HPI


HPI: 





08/24/18 16:22


The patient is a 62 year old female, with a significant past medical history of 

hypothyroidism and CVA, who presents to the emergency department with 3 days of 

6/10 pain and itching to her bilateral breasts and thick discharge from her 

nipples. 





Family denies any recent sick contacts. Family denies any recent fever, 

headaches, abnormal urinary, or bowel symptoms.





Allergies: Ampicillin. Penicillin


Primary Care Physician: Dr. Valdez-Washington


Endo: Dr. Baxter








- Physicial Exam


PE: 


08/24/18 16:22





GENERAL: The patient is in no acute distress.


HEAD: Normal with no signs of trauma.


NECK: Normal range of motion, supple without lymphadenopathy, JVD, or masses.


LUNGS: Breath sounds equal, clear to auscultation bilaterally. No wheezes, and 

no crackles.


HEART:Regular rate and rhythm, normal S1 and S2 without murmur, rub or gallop.


ABDOMEN: Soft, nontender, normoactive bowel sounds. No guarding, no rebound. No 

masses palpable.


EXTREMITIES: Normal range of motion, no edema. No clubbing or cyanosis. No 

erythema, or tenderness.


NEUROLOGICAL: Cranial nerves II through XII grossly intact. Normal speech. No 

focal neurological deficits.


SKIN: (+) retracted nipples bilaterally. No erythema or increased warmth. No 

breast swelling or tenderness on palpation.  Warm, Dry, normal turgor, no 

rashes or lesions noted. 





- Medical Decision Making


08/24/18 16:25


Documentation prepared by Shaniqua Rosario, acting as medical scribe for Simi Helton MD





<Shaniqua Rosario - Last Filed: 08/24/18 16:22>





- Resident


Resident Name: Leonard Bobo





- ED Attending Attestation


I have performed the following: I have examined & evaluated the patient, The 

case was reviewed & discussed with the resident, I agree w/resident's findings 

& plan, Exceptions are as noted





- Medical Decision Making





08/24/18 16:18


63 yo F h/o hyponatremia on steroids who presents to the ER for evaluation of 

breast pain


Pt presents for mamography


Pt has no signs of abscess or mass


nipples retracted


expressible white fluid


symptoms present for the past 3 days





Case reviewed with pt endocrinologist


she will follow up with him tomorrow


she will follow up with pmd


I have asked this patient to follow up ASAP with breast for mammography





Clinical impression: breast pain, initial presentation





<Simi Helton - Last Filed: 08/27/18 11:15>

## 2018-11-23 ENCOUNTER — HOSPITAL ENCOUNTER (INPATIENT)
Dept: HOSPITAL 74 - JER | Age: 63
LOS: 2 days | Discharge: HOME | DRG: 723 | End: 2018-11-25
Attending: INTERNAL MEDICINE | Admitting: INTERNAL MEDICINE
Payer: COMMERCIAL

## 2018-11-23 VITALS — BODY MASS INDEX: 24 KG/M2

## 2018-11-23 DIAGNOSIS — J01.90: ICD-10-CM

## 2018-11-23 DIAGNOSIS — B37.89: ICD-10-CM

## 2018-11-23 DIAGNOSIS — E27.40: ICD-10-CM

## 2018-11-23 DIAGNOSIS — E87.1: ICD-10-CM

## 2018-11-23 DIAGNOSIS — E03.9: ICD-10-CM

## 2018-11-23 DIAGNOSIS — G93.41: ICD-10-CM

## 2018-11-23 DIAGNOSIS — Z86.73: ICD-10-CM

## 2018-11-23 DIAGNOSIS — B34.9: Primary | ICD-10-CM

## 2018-11-23 DIAGNOSIS — Z88.0: ICD-10-CM

## 2018-11-23 DIAGNOSIS — R41.82: ICD-10-CM

## 2018-11-23 DIAGNOSIS — R51: ICD-10-CM

## 2018-11-23 LAB
ALBUMIN SERPL-MCNC: 4.1 G/DL (ref 3.4–5)
ALP SERPL-CCNC: 175 U/L (ref 45–117)
ALT SERPL-CCNC: 26 U/L (ref 13–61)
ANION GAP SERPL CALC-SCNC: 12 MMOL/L (ref 8–16)
APTT BLD: 43 SECONDS (ref 25.2–36.5)
AST SERPL-CCNC: 50 U/L (ref 15–37)
BASOPHILS # BLD: 0.6 % (ref 0–2)
BILIRUB SERPL-MCNC: 0.5 MG/DL (ref 0.2–1)
BUN SERPL-MCNC: 16 MG/DL (ref 7–18)
CALCIUM SERPL-MCNC: 8.3 MG/DL (ref 8.5–10.1)
CHLORIDE SERPL-SCNC: 90 MMOL/L (ref 98–107)
CO2 SERPL-SCNC: 27 MMOL/L (ref 21–32)
CREAT SERPL-MCNC: 1 MG/DL (ref 0.55–1.3)
DEPRECATED RDW RBC AUTO: 12.8 % (ref 11.6–15.6)
EOSINOPHIL # BLD: 1.3 % (ref 0–4.5)
GLUCOSE SERPL-MCNC: 96 MG/DL (ref 74–106)
HCT VFR BLD CALC: 37.2 % (ref 32.4–45.2)
HGB BLD-MCNC: 12.6 GM/DL (ref 10.7–15.3)
INR BLD: 1.16 (ref 0.83–1.09)
LYMPHOCYTES # BLD: 23.3 % (ref 8–40)
MCH RBC QN AUTO: 31 PG (ref 25.7–33.7)
MCHC RBC AUTO-ENTMCNC: 33.9 G/DL (ref 32–36)
MCV RBC: 91.4 FL (ref 80–96)
MONOCYTES # BLD AUTO: 7.1 % (ref 3.8–10.2)
NEUTROPHILS # BLD: 67.7 % (ref 42.8–82.8)
PH BLDV: 7.43 [PH] (ref 7.32–7.42)
PLATELET # BLD AUTO: 194 K/MM3 (ref 134–434)
PMV BLD: 8 FL (ref 7.5–11.1)
POTASSIUM SERPLBLD-SCNC: 3.5 MMOL/L (ref 3.5–5.1)
PROT SERPL-MCNC: 8 G/DL (ref 6.4–8.2)
PT PNL PPP: 13.7 SEC (ref 9.7–13)
RBC # BLD AUTO: 4.07 M/MM3 (ref 3.6–5.2)
SODIUM SERPL-SCNC: 129 MMOL/L (ref 136–145)
VENOUS PC02: 36.8 MMHG (ref 38–52)
VENOUS PO2: 50.6 MMHG (ref 28–48)
WBC # BLD AUTO: 7.7 K/MM3 (ref 4–10)

## 2018-11-23 NOTE — PDOC
Attending Attestation





- HPI


HPI: 





11/24/18 00:14


The patient is a 62 year old female, with a significant past medical history of 

hypothyroidism, hyponatremia, CVA, who presents to the emergency department with

, 3 days of AMS, weakness, fever, sore throat, and dysuria. Patients family 

notes a Tmax of 103 degrees Fahrenheit. Patient is not up to date with her flu 

shot. She denies any hematuria.  She denies recent chest pain or shortness of 

breath.





Allergies: Ampicillin. Penicillin.


Primary Care Physician: Dr. Rothman








- Physicial Exam


PE: 





11/24/18 01:03


+Constitutional: Warm to touch. Awake, alert, oriented.  No acute distress.


Head:  Normocephalic.  Atraumatic


Eyes:  PERRL. EOMI.  Conjunctivae are not pale.


+ENT:  Poor dentian. Posterior pharynx erythematous without exudates. Mucous 

membranes are moist and intact.


Neck:  Supple.  Full ROM. No lymphadenopathy.


+Cardiovascular:  Tachycardic.  Distal pulses are 2+ and symmetric.  


Pulmonary/Chest:  No evidence of respiratory distress.  Clear to auscultation 

bilaterally  No wheezing, rales or rhonchi.


+Abdominal:  Suprapubic tenderness. Soft and non-distended.  No rebound, 

guarding or rigidity.  No organomegaly. No palpable masses. Good bowel sounds.


Back:  No CVA tenderness.


Musculoskeletal:  No edema.  No cyanosis.  No clubbing.  Full range of motion 

in all extremities.  No calf tenderness. Radial/pedal pulses are intact and 2+ 

bilaterally


Skin:  Skin is warm and dry.  No petechiae.  No purpura.  


Neurological:  Alert and oriented to person, place, and time.  Cranial nerves II

-XII are grossly intact. Normal speech. Strength is grossly symmetric. No 

sensory deficits.


Psychiatric:  Good eye contact.  Normal interaction, affect and behavior.








<Carolyn Ascencio - Last Filed: 11/24/18 01:03>





- Resident


Resident Name: Ethan Jung





- ED Attending Attestation


I have performed the following: I have examined & evaluated the patient, The 

case was reviewed & discussed with the resident, I agree w/resident's findings 

& plan, Exceptions are as noted





- Medical Decision Making





11/23/18 23:37








I, Dr. Gem Doe, DO, attest that this document has been prepared under 

my direction and personally reviewed by me in its entirety.   I further attest, 

that it accurately reflects all work, treatment, procedures and medical decision

-making performed by me.  


11/23/18 23:38


a/p: 63yo female with fever and sore throat


-sore throat x 3 days, fever of 103 


-also with dysuria


-will send labs, cultures, ua, blood cultures, strep swab, flu swab


-will give ivf hydration


-will start gram neg pam coverage for dysuria


-will most likely need admission


-will monitor and reassess


11/24/18 02:01


pt with fever and change in MS


flu negative


strep negative


low sodium


will admit to [facility] for further eval pending cultures





<Gem Doe - Last Filed: 11/24/18 02:04>





**Heart Score/ECG Review





- ECG Intrepretation


Comment:: 





11/23/18 23:43


sinus at 98, t wave inversions anterior leads, nl axis, abnl ekg





<Gem Doe - Last Filed: 11/24/18 02:04>





Attestations





- Attestations





11/24/18 00:15





Documentation prepared by Carolyn Ascencio, acting as medical scribe for 

Gem Doe DO.





<Carolyn Ascencio - Last Filed: 11/24/18 01:03>

## 2018-11-23 NOTE — PDOC
Rapid Medical Evaluation


Time Seen by Provider: 11/23/18 21:05


Medical Evaluation: 


 Allergies











Allergy/AdvReac Type Severity Reaction Status Date / Time


 


ampicillin Allergy  Swelling Verified 08/24/18 15:07


 


Penicillins Allergy  Swelling Verified 08/24/18 15:07











11/23/18 21:05


Nephrologist: Dr. Haro [phone]


I have performed a brief in person evaluation of this patient.


+H/O low B/P , Hyponatremia





The patient presents with a chief complaints of: as per son and daughter in law

: lethargy/weak and fever since today





Pertinent physical exam findings: L/S CTAB





I have ordered the following: cbc/cmp/ua/uc/blood culture x2, ekg, chest xray, 

iv, lactic





The patient will proceed to the ED for further evaluation.


11/23/18 21:13

## 2018-11-23 NOTE — PDOC
History of Present Illness





- General


Chief Complaint: SIRS, Suspected/Possible


Stated Complaint: WEAKNESS


Time Seen by Provider: 11/23/18 21:05


History Source: Patient, Family


Exam Limitations: Language Barrier ([id number])





- History of Present Illness


Initial Comments: 





11/24/18 00:04


63 yo F with a hx of hyponatremia, nephrolithiasis, CVA, and hypothyroidism 

presents to the emergency department with weakness in the setting of sore 

throat. The history was limited as the patient kept falling back to sleep. Per 

the patient, she denies having pain. Denies the following: fevers, chest pain, 

SOB, abdominal pain, and lightheadedness. Per the family at bedside (grandson 

with wife), he states she complained of a sore throat and that she becomes very 

weak when she has hypotension and hyponatremia. She was seen in the hospital 

over the summer for hyponatremia episode. 





Pmhx: Refer to above


Shx: Unable to assess


Meds: hydrocortisone


Allergies: PCN


Social: Denies tobacco, alcohol, and substance abuse











Past History





- Past Medical History


Allergies/Adverse Reactions: 


 Allergies











Allergy/AdvReac Type Severity Reaction Status Date / Time


 


ampicillin Allergy  Swelling Verified 08/24/18 15:07


 


Penicillins Allergy  Swelling Verified 08/24/18 15:07











Home Medications: 


Ambulatory Orders





Hydrocortisone 10 mg PO DAILY 11/24/18 








Anemia: No


Asthma: No


Cancer: No


Cardiac Disorders: No


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


COPD: No


HTN: Yes


Thyroid Disease: Yes (has not taken medication >7yrs)


Other medical history: Hyponatremia





- Surgical History


Abdominal Surgery: No


Appendectomy: No


Cholecystectomy: No


Gastric Stapling: No





- Suicide/Smoking/Psychosocial Hx


Smoking Status: No


Smoking History: Never smoked


Have you smoked in the past 12 months: No


Number of Cigarettes Smoked Daily: 0


Hx Alcohol Use: No


Drug/Substance Use Hx: No


Substance Use Type: None





**Review of Systems





- Review of Systems


Able to Perform ROS?: No (lethargic -falling asleep)





*Physical Exam





- Vital Signs


 Last Vital Signs











Temp Pulse Resp BP Pulse Ox


 


 103 F H  99 H  20   144/74   97 


 


 11/23/18 21:08  11/23/18 21:08  11/23/18 21:08  11/23/18 21:08  11/23/18 21:08














- Physical Exam


General Appearance: Yes: Nourished, Appropriately Dressed, Other (lethargic)


HEENT: positive: EOMI, TONIO, Pharyngeal Erythema, Tonsillar Erythema.  negative

: Normal Voice, Scleral Icterus (R), Scleral Icterus (L), Muffled/Hoarse voice, 

Tonsillar Exudate, Sinus Tenderness, Excessive drooling


Neck: positive: Trachea midline.  negative: Decreased range of motion, 

Lymphadenopathy (R), Lymphadenopathy (L), Tender lateral, Tender midline


Respiratory/Chest: positive: Lungs Clear, Normal Breath Sounds.  negative: 

Chest Tender, Respiratory Distress, Accessory Muscle Use, Crackles, Rales, 

Rhonchi, Stridor, Wheezing


Cardiovascular: positive: Regular Rhythm, S1, S2, Tachycardia.  negative: 

Systolic Murmur


Gastrointestinal/Abdominal: positive: Normal Bowel Sounds, Tender (suprepubic 

region), Flat, Soft.  negative: Protuberent, Distended


Lymphatic: negative: Adenopathy


Musculoskeletal: positive: Normal Inspection.  negative: CVA Tenderness, CVA 

Tenderness (R), CVA Tenderness (L)


Extremity: positive: Normal Capillary Refill, Normal Inspection, Normal Range 

of Motion.  negative: Tender


Integumentary: positive: Normal Color, Dry, Warm, Other (felt hot to the touch)

.  negative: Rash, Swelling


Neurologic: positive: Alert, Responsive.  negative: Fully Oriented (oriented to 

person. ), Facial Droop





ED Treatment Course





- LABORATORY


CBC & Chemistry Diagram: 


 11/24/18 05:20





 11/24/18 05:20





- ADDITIONAL ORDERS


Additional order review: 


 Laboratory  Results











  11/23/18 11/23/18





  21:20 21:20


 


PT with INR   13.70 H


 


INR   1.16 H


 


PTT (Actin FS)   43.0 H


 


Lactic Acid  2.1 H 














- Medications


Given in the ED: 


ED Medications














Discontinued Medications














Generic Name Dose Route Start Last Admin





  Trade Name Freq  PRN Reason Stop Dose Admin


 


Acetaminophen  1,000 mg  11/23/18 21:10  11/23/18 21:54





  Ofirmev Injection -  IVPB  11/23/18 21:11  1,000 mg





  ONCE ONE   Administration





     





     





     





     














Medical Decision Making





- Medical Decision Making





11/24/18 02:38


63 yo F with a hx of hyponatremia, nephrolithiasis, CVA, and hypothyroidism 

presents to the emergency department with weakness in the setting of sore 

throat.





Initial vitals


 Initial Vital Signs











Temp Pulse Resp BP Pulse Ox


 


 103 F H  99 H  20   144/74   97 


 


 11/23/18 21:08  11/23/18 21:08  11/23/18 21:08  11/23/18 21:08  11/23/18 21:08








Work up


ddx: ams in setting of fever (infectious etiology (PNA vs UTI) vs neurological (

CVA? vs intracranial mass) vs metabolic disturbance (hyponatremia vs hypo/hyper 

magnesium/calcium)


 Laboratory Tests











  11/23/18 11/23/18 11/23/18





  21:20 21:20 21:20


 


WBC  7.7  


 


RBC  4.07  


 


Hgb  12.6  


 


Hct  37.2  


 


MCV  91.4  


 


MCH  31.0  


 


MCHC  33.9  


 


RDW  12.8  D  


 


Plt Count  194  D  


 


MPV  8.0  D  


 


Absolute Neuts (auto)  5.2  


 


Neutrophils %  67.7  


 


Lymphocytes %  23.3  D  


 


Monocytes %  7.1  


 


Eosinophils %  1.3  D  


 


Basophils %  0.6  


 


Nucleated RBC %  0  


 


PT with INR   


 


INR   


 


PTT (Actin FS)   


 


VBG pH   


 


POC VBG pCO2   


 


POC VBG pO2   


 


Mixed VBG HCO3   


 


Sodium    129 L


 


Potassium    3.5


 


Chloride    90 L


 


Carbon Dioxide    27


 


Anion Gap    12


 


BUN    16


 


Creatinine    1.0


 


Creat Clearance w eGFR    55.82


 


Random Glucose    96


 


Lactic Acid   


 


Calcium    8.3 L


 


Total Bilirubin    0.5


 


AST    50 H


 


ALT    26


 


Alkaline Phosphatase    175 H


 


Total Protein    8.0


 


Albumin    4.1


 


TSH    1.30


 


Urine Color   Yellow 


 


Urine Appearance   Clear 


 


Urine pH   5.0  D 


 


Ur Specific Gravity   1.013 


 


Urine Protein   2+ H 


 


Urine Glucose (UA)   Negative 


 


Urine Ketones   Negative 


 


Urine Blood   3+ H 


 


Urine Nitrite   Negative 


 


Urine Bilirubin   Negative 


 


Urine Urobilinogen   Negative 


 


Ur Leukocyte Esterase   Negative 


 


Urine WBC (Auto)   3 


 


Urine RBC (Auto)   86 


 


Urine Bacteria   Few 


 


Urine Osmolality   


 


Influenza A (Rapid)   


 


Influenza B (Rapid)   


 


Group A Strep Rapid   














  11/23/18 11/23/18 11/23/18





  21:20 21:20 21:20


 


WBC   


 


RBC   


 


Hgb   


 


Hct   


 


MCV   


 


MCH   


 


MCHC   


 


RDW   


 


Plt Count   


 


MPV   


 


Absolute Neuts (auto)   


 


Neutrophils %   


 


Lymphocytes %   


 


Monocytes %   


 


Eosinophils %   


 


Basophils %   


 


Nucleated RBC %   


 


PT with INR  13.70 H  


 


INR  1.16 H  


 


PTT (Actin FS)  43.0 H  


 


VBG pH   


 


POC VBG pCO2   


 


POC VBG pO2   


 


Mixed VBG HCO3   


 


Sodium   


 


Potassium   


 


Chloride   


 


Carbon Dioxide   


 


Anion Gap   


 


BUN   


 


Creatinine   


 


Creat Clearance w eGFR   


 


Random Glucose   


 


Lactic Acid   2.1 H 


 


Calcium   


 


Total Bilirubin   


 


AST   


 


ALT   


 


Alkaline Phosphatase   


 


Total Protein   


 


Albumin   


 


TSH   


 


Urine Color   


 


Urine Appearance   


 


Urine pH   


 


Ur Specific Gravity   


 


Urine Protein   


 


Urine Glucose (UA)   


 


Urine Ketones   


 


Urine Blood   


 


Urine Nitrite   


 


Urine Bilirubin   


 


Urine Urobilinogen   


 


Ur Leukocyte Esterase   


 


Urine WBC (Auto)   


 


Urine RBC (Auto)   


 


Urine Bacteria   


 


Urine Osmolality    483


 


Influenza A (Rapid)   


 


Influenza B (Rapid)   


 


Group A Strep Rapid   














  11/23/18 11/23/18 11/23/18





  21:40 23:01 23:40


 


WBC   


 


RBC   


 


Hgb   


 


Hct   


 


MCV   


 


MCH   


 


MCHC   


 


RDW   


 


Plt Count   


 


MPV   


 


Absolute Neuts (auto)   


 


Neutrophils %   


 


Lymphocytes %   


 


Monocytes %   


 


Eosinophils %   


 


Basophils %   


 


Nucleated RBC %   


 


PT with INR   


 


INR   


 


PTT (Actin FS)   


 


VBG pH  7.43 H  


 


POC VBG pCO2  36.8 L  


 


POC VBG pO2  50.6 H D  


 


Mixed VBG HCO3  23.9  


 


Sodium   


 


Potassium   


 


Chloride   


 


Carbon Dioxide   


 


Anion Gap   


 


BUN   


 


Creatinine   


 


Creat Clearance w eGFR   


 


Random Glucose   


 


Lactic Acid   


 


Calcium   


 


Total Bilirubin   


 


AST   


 


ALT   


 


Alkaline Phosphatase   


 


Total Protein   


 


Albumin   


 


TSH   


 


Urine Color   


 


Urine Appearance   


 


Urine pH   


 


Ur Specific Gravity   


 


Urine Protein   


 


Urine Glucose (UA)   


 


Urine Ketones   


 


Urine Blood   


 


Urine Nitrite   


 


Urine Bilirubin   


 


Urine Urobilinogen   


 


Ur Leukocyte Esterase   


 


Urine WBC (Auto)   


 


Urine RBC (Auto)   


 


Urine Bacteria   


 


Urine Osmolality   


 


Influenza A (Rapid)   Negative 


 


Influenza B (Rapid)   Negative 


 


Group A Strep Rapid    Negative








prior to UA results, she was started on aztreonam to cover the possible 

infection given her subsequent re-evaluation in which she has been complaining 

of dysuria.





patients urine shows few bacteria but negative for nitrite and leukocyte 

estease. CXR does not show acute pathologies. CBC did not yield leukocytosis. 

Her lactic acid is 2.1. Given NS 1 L. She continues to have AMS





Patient was re-evaluated and found to be improved on her AMS. Flu and strep 

were negative. Will admit due to fevers in setting of AMS with negative CT head.





Dispo:


Admit 








11/24/18 09:32








*DC/Admit/Observation/Transfer


Diagnosis at time of Disposition: 


Altered mental status


Qualifiers:


 Altered mental status type: unspecified Qualified Code(s): R41.82 - Altered 

mental status, unspecified








- Referrals





- Patient Instructions





- Post Discharge Activity

## 2018-11-24 LAB
ALBUMIN SERPL-MCNC: 2.8 G/DL (ref 3.4–5)
ALP SERPL-CCNC: 117 U/L (ref 45–117)
ALT SERPL-CCNC: 21 U/L (ref 13–61)
ANION GAP SERPL CALC-SCNC: 7 MMOL/L (ref 8–16)
APPEARANCE UR: CLEAR
AST SERPL-CCNC: 38 U/L (ref 15–37)
BACTERIA #/AREA URNS HPF: (no result) /HPF
BILIRUB SERPL-MCNC: 0.5 MG/DL (ref 0.2–1)
BILIRUB UR STRIP.AUTO-MCNC: NEGATIVE MG/DL
BUN SERPL-MCNC: 15 MG/DL (ref 7–18)
CALCIUM SERPL-MCNC: 7 MG/DL (ref 8.5–10.1)
CHLORIDE SERPL-SCNC: 99 MMOL/L (ref 98–107)
CO2 SERPL-SCNC: 25 MMOL/L (ref 21–32)
COLOR UR: YELLOW
CREAT SERPL-MCNC: 0.7 MG/DL (ref 0.55–1.3)
DEPRECATED RDW RBC AUTO: 12.9 % (ref 11.6–15.6)
GLUCOSE SERPL-MCNC: 89 MG/DL (ref 74–106)
HCT VFR BLD CALC: 30.5 % (ref 32.4–45.2)
HGB BLD-MCNC: 10.4 GM/DL (ref 10.7–15.3)
KETONES UR QL STRIP: NEGATIVE
LEUKOCYTE ESTERASE UR QL STRIP.AUTO: NEGATIVE
MCH RBC QN AUTO: 31.3 PG (ref 25.7–33.7)
MCHC RBC AUTO-ENTMCNC: 34.1 G/DL (ref 32–36)
MCV RBC: 91.7 FL (ref 80–96)
NITRITE UR QL STRIP: NEGATIVE
PH UR: 5 [PH] (ref 5–8)
PLATELET # BLD AUTO: 140 K/MM3 (ref 134–434)
PMV BLD: 7.2 FL (ref 7.5–11.1)
POTASSIUM SERPLBLD-SCNC: 3.3 MMOL/L (ref 3.5–5.1)
PROT SERPL-MCNC: 5.5 G/DL (ref 6.4–8.2)
PROT UR QL STRIP: (no result)
PROT UR QL STRIP: NEGATIVE
RBC # BLD AUTO: 3.33 M/MM3 (ref 3.6–5.2)
SODIUM SERPL-SCNC: 130 MMOL/L (ref 136–145)
SP GR UR: 1.01 (ref 1.01–1.03)
UROBILINOGEN UR STRIP-MCNC: NEGATIVE MG/DL (ref 0.2–1)
WBC # BLD AUTO: 5.6 K/MM3 (ref 4–10)

## 2018-11-24 RX ADMIN — HYDROCORTISONE SCH MG: 10 TABLET ORAL at 21:15

## 2018-11-24 RX ADMIN — ENOXAPARIN SODIUM SCH MG: 40 INJECTION SUBCUTANEOUS at 11:41

## 2018-11-24 RX ADMIN — SODIUM CHLORIDE SCH MLS/HR: 9 INJECTION, SOLUTION INTRAVENOUS at 06:16

## 2018-11-24 RX ADMIN — SODIUM CHLORIDE SCH MLS/HR: 9 INJECTION, SOLUTION INTRAVENOUS at 15:31

## 2018-11-24 NOTE — HP
CHIEF COMPLAINT: sore throat





PCP:





HISTORY OF PRESENT ILLNESS:


Patient is a 61 y/o female with a history of hyponatremia, nephrolithiasis, CVA 

and hypothyroidism who presents for sore throat. She reports it began on 

wednesday and she has not been able to do anything to make it better. She 

denies any sick contacts and she did not get the flu shot this year. Patient 

reports also having a non radiating headache on her R temple. Patient denies 

having a fever, chest pain, nausea, pain on urination, or blood in his urine. 

Patient reports she is able to walk at home and has been eating  a lot, but 

because her throat has been hurting she hasn't been drinking as much water.





ER course was notable for:


(1)


(2)


(3)





Recent Travel:





PAST MEDICAL HISTORY:  hyponatremia, nephrolithiasis, CVA, hypothyroidism





PAST SURGICAL HISTORY:  C section





Social History:


Smoking: denies


Alcohol: denies


Drugs:  denies





Family History:


Allergies





ampicillin Allergy (Verified 08/24/18 15:07)


 Swelling


Penicillins Allergy (Verified 08/24/18 15:07)


 Swelling








HOME MEDICATIONS:


 Home Medications











 Medication  Instructions  Recorded


 


Hydrocortisone 20 mg PO DAILY #14 tablet 08/14/18


 


Nystatin Ointment [Mycostatin 1 applic TP BID #1 tube 08/24/18





Ointment -]  








REVIEW OF SYSTEMS


CONSTITUTIONAL: 


Absent:  fever, chills, diaphoresis, generalized weakness, malaise, loss of 

appetite, weight change


HEENT: 


Absent:  rhinorrhea, nasal congestion, throat pain, throat swelling, difficulty 

swallowing, mouth swelling, ear pain, eye pain, visual changes


CARDIOVASCULAR: 


Absent: chest pain, syncope, palpitations, irregular heart rate, lightheadedness

, peripheral edema


RESPIRATORY: 


Absent: cough, shortness of breath, dyspnea with exertion, orthopnea, wheezing, 

stridor, hemoptysis


GASTROINTESTINAL:


Absent: abdominal pain, abdominal distension, nausea, vomiting, diarrhea, 

constipation, melena, hematochezia


GENITOURINARY: 


Absent: dysuria, frequency, urgency, hesitancy, hematuria, flank pain, genital 

pain


MUSCULOSKELETAL: 


Absent: myalgia, arthralgia, joint swelling, back pain, neck pain


SKIN: 


Absent: rash, itching, pallor


HEMATOLOGIC/IMMUNOLOGIC: 


Absent: easy bleeding, easy bruising, lymphadenopathy, frequent infections


ENDOCRINE:


Absent: unexplained weight gain, unexplained weight loss, heat intolerance, 

cold intolerance


NEUROLOGIC: headache


Absent:  focal weakness or paresthesias, dizziness, unsteady gait, seizure, 

mental status changes, bladder or bowel incontinence


PSYCHIATRIC: 


Absent: anxiety, depression, suicidal or homicidal ideation, hallucinations.








PHYSICAL EXAMINATION


 Vital Signs - 24 hr











  11/23/18





  21:08


 


Temperature 103 F H


 


Pulse Rate 99 H


 


Respiratory 20





Rate 


 


Blood Pressure 144/74


 


O2 Sat by Pulse 97





Oximetry (%) 











GENERAL: Awake, alert, and fully oriented, in no acute distress.


HEAD: Normal with no signs of trauma.


EYES: Pupils equal, round and reactive to light, extraocular movements intact


EARS, NOSE, THROAT:  oropharynx clear without exudates. Moist mucous membranes.


NECK: Normal range of motion, supple without lymphadenopathy, JVD, or masses.


LUNGS: Breath sounds equal, clear to auscultation bilaterally. No wheezes, and 

no crackles. No accessory muscle use.


HEART: Regular rate and rhythm, normal S1 and S2 without murmur, rub or gallop.


ABDOMEN: Soft, nontender, not distended, normoactive bowel sounds, no guarding, 

no rebound, no masses.  


LOWER EXTREMITIES: 2+ pulses, warmNo calf tenderness. No peripheral edema. 


NEUROLOGICAL:  Cranial nerves II-XII intact. Normal speech.


PSYCHIATRIC: Cooperative. Good eye contact. Appropriate mood and affect.


SKIN: Warm, dry, normal turgor, no rashes or lesions noted, normal capillary 

refill. 


 





 CBC, BMP





 11/23/18 21:20 





 11/23/18 21:20 








 Urine Test Results











Urine Color  Yellow   11/23/18  21:20    


 


Urine Appearance  Clear   11/23/18  21:20    


 


Urine pH  5.0  (5.0-8.0)  D 11/23/18  21:20    


 


Ur Specific Gravity  1.013  (1.010-1.035)   11/23/18  21:20    


 


Urine Protein  2+  (NEGATIVE)  H  11/23/18  21:20    


 


Urine Glucose (UA)  Negative  (NEGATIVE)   11/23/18  21:20    


 


Urine Ketones  Negative  (NEGATIVE)   11/23/18  21:20    


 


Urine Blood  3+  (NEGATIVE)  H  11/23/18  21:20    


 


Urine Nitrite  Negative  (NEGATIVE)   11/23/18  21:20    


 


Urine Bilirubin  Negative  (<2.0 mg/dL)   11/23/18  21:20    


 


Ur Leukocyte Esterase  Negative  (NEGATIVE)   11/23/18  21:20    


 


Urine Bacteria  Few /hpf (NONE SEEN)   11/23/18  21:20    

















ASSESSMENT/PLAN:


Patient is a 61 y/o female with a history of hyponatremia, nephrolithiasis, CVA 

and hypothyroidism who presents for sore throat.





#hyponatremia, chronic


   - patient has a hx of hyponatremia and received salt tabs last visit


   - f/u Dr. Haro


   - f/u osmoles and sodium 


   - possible adrenal insufficency :continue hydrocortisone 10 mg daily





#SIRS, no source of infection


   - patient had temperature of 103, repeat 98.3


   - BP dropped to 88/44, patient given bolus and started on NS


   - UA 3 WBC, no LE


   - f/u blood cx


   - Lactic acid 2.1, continue to trend


   - influenza and strep negative


   - Echo 5/30/18 : EF normal, LV size and function normal


   - given Vanc and Aztreonam in the ED


   - conitinue Vanc and Aztreonam 





#hypothyroidism


   - not currently treated, TSH WNL





#CVA


   - questionable AMS


   - patient A & O x3 upon examination


   - f/u head CT





#DVT ppx


   - Lovenox 40 mg sq 





Visit type





- Emergency Visit


Emergency Visit: Yes


ED Registration Date: 11/24/18


Care time: The patient presented to the Emergency Department on the above date 

and was hospitalized for further evaluation of their emergent condition.





- New Patient


This patient is new to me today: Yes


Date on this admission: 11/24/18





- Critical Care


Critical Care patient: No

## 2018-11-24 NOTE — PN
Teaching Attending Note


Name of Resident: Faith Chou





ATTENDING PHYSICIAN STATEMENT





I saw and evaluated the patient.


I reviewed the resident's note and discussed the case with the resident.


I agree with the resident's findings and plan as documented.








SUBJECTIVE:


Patient is a 62 year old woman with history of hyponatremia, penicillin allergy

, nephrolithiasis, CVA, and hypothyroidism presents to the ER with weakness in 

the setting of sore throat. The history was limited as the patient kept falling 

back to sleep. Per the patient, she denies having pain. Denies the following: 

fevers, chest pain, SOB, abdominal pain, and lightheadedness. Per the family at 

bedside (grandson with wife), he states she complained of a sore throat and 

that she becomes very weak when she has hypotension and hyponatremia. She was 

seen in the hospital over the summer for hyponatremia episode. No photophobia 

or blurry vision.





OBJECTIVE:


Alert and weak


 Vital Signs











 Period  Temp  Pulse  Resp  BP Sys/Ariza  Pulse Ox


 


 Last 24 Hr  103 F  99  20  144/74  97








HEENT: No Jaundice, eye redness or discharge, PERRLA, EOMI. Normocephalic, 

atraumatic. External ears are normal and hearing is grossly intact. No nasal 

discharge.


Neck: Supple, nontender. No palpable adenopathy or thyromegaly. No JVD


Chest: Good effort. Clear to auscultation and percussion.


Heart: Regular. No S3, rub or murmur


Abdomen: Not distended, soft, nontender and no HSM. No rebound or guarding.  

Normoactive bowel sounds.


Ext: Peripheral pulses intact. No leg edema.


Skin: Warm and dry. No petechiae, rash or ecchymosis.


Neuro: Alert. Oriented x3. CN 2-12 grossly intact. Sensation grossly intact in 

all four extremities and DTR are symmetric.


 Home Medications











 Medication  Instructions  Recorded


 


Hydrocortisone 20 mg PO DAILY #14 tablet 08/14/18


 


Nystatin Ointment [Mycostatin 1 applic TP BID #1 tube 08/24/18





Ointment -]  








 Abnormal Lab Results











  11/23/18 11/23/18 11/23/18





  21:20 21:20 21:20


 


PT with INR    13.70 H


 


INR    1.16 H


 


PTT (Actin FS)    43.0 H


 


VBG pH   


 


POC VBG pCO2   


 


POC VBG pO2   


 


Sodium   129 L 


 


Chloride   90 L 


 


Lactic Acid   


 


Calcium   8.3 L 


 


AST   50 H 


 


Alkaline Phosphatase   175 H 


 


Urine Protein  2+ H  


 


Urine Blood  3+ H  














  11/23/18 11/23/18





  21:20 21:40


 


PT with INR  


 


INR  


 


PTT (Actin FS)  


 


VBG pH   7.43 H


 


POC VBG pCO2   36.8 L


 


POC VBG pO2   50.6 H D


 


Sodium  


 


Chloride  


 


Lactic Acid  2.1 H 


 


Calcium  


 


AST  


 


Alkaline Phosphatase  


 


Urine Protein  


 


Urine Blood  











ASSESSMENT AND PLAN:


1. Viral syndrome - By the time i saw the patient she was alert and had no new 

specific complaints. Records show chronic recurrent hyponatremia though her 

serum sodium was normal a few months ago. Hyponatremia may be due to 

hypothyroidism - she has not been taking her medications. Symptoms consistent 

with a viral syndrome. Will evaluate hyponatremia and rule out adrenal 

hypofunction. Treat with IV NS at 75 ml/hour and avoid hypotonic fluids. 

Monitor serum sodium q 6 hours, trend lactic acid and consult nephrology. No 

indication for antibiotics at this time. Flu and strept swabs negative. No 

infiltrate on CXR.





2. DVT prophylaxis - Lovenox 40 mg SQ q 24 hours.





3. Advance directives - Full code

## 2018-11-24 NOTE — HOSP
Subjective





- Review of Symptoms


Events since last encounter: 





Patient is feeling better with no acute distress. AAOx3, nfd, c/o having 

headache, no meningeal sign. 





  Initial Vital Signs











Temp Pulse Resp BP Pulse Ox


 


 103 F H  99 H  20   144/74   97 


 


 11/23/18 21:08  11/23/18 21:08  11/23/18 21:08  11/23/18 21:08  11/23/18 21:08








Vital Signs











Temperature  99.5 F   11/24/18 07:56


 


Pulse Rate  89   11/24/18 07:56


 


Respiratory Rate  18   11/24/18 07:56


 


Blood Pressure  116/58 L  11/24/18 07:56


 


O2 Sat by Pulse Oximetry (%)  96   11/24/18 07:56








GENERAL: Awake, alert, and fully oriented, in no acute distress.


HEAD: Normal with no signs of trauma.


EYES: Pupils equal, round and reactive to light, extraocular movements intact


EARS, NOSE, THROAT:  oropharynx clear without exudates. Moist mucous membranes.


NECK: Normal range of motion, supple without lymphadenopathy, JVD, or masses.


LUNGS: Breath sounds equal, clear to auscultation bilaterally. No wheezes, and 

no crackles. No accessory muscle use.


HEART: RRR,  normal S1 and S2 without murmur, rub or gallop.


ABDOMEN: Soft, nontender, not distended, normoactive bowel sounds, no guarding, 

no rebound, no masses.  


EXTREMITIES: 2+ pulses, warmNo calf tenderness. No peripheral edema. 


NEUROLOGICAL:  Cranial nerves II-XII intact. Normal speech.


PSYCHIATRIC: Cooperative. Good eye contact. Appropriate mood and affect.


SKIN: Warm, dry, normal turgor, no rashes or lesions noted, normal capillary 

refill. 





 CBCD











WBC  5.6 K/mm3 (4.0-10.0)   11/24/18  05:20    


 


RBC  3.33 M/mm3 (3.60-5.2)  L  11/24/18  05:20    


 


Hgb  10.4 GM/dL (10.7-15.3)  L  11/24/18  05:20    


 


Hct  30.5 % (32.4-45.2)  L D 11/24/18  05:20    


 


MCV  91.7 fl (80-96)   11/24/18  05:20    


 


MCHC  34.1 g/dl (32.0-36.0)   11/24/18  05:20    


 


RDW  12.9 % (11.6-15.6)   11/24/18  05:20    


 


Plt Count  140 K/MM3 (134-434)  D 11/24/18  05:20    


 


MPV  7.2 fl (7.5-11.1)  L  11/24/18  05:20    








 CMP











Sodium  130 mmol/L (136-145)  L  11/24/18  05:20    


 


Potassium  3.3 mmol/L (3.5-5.1)  L  11/24/18  05:20    


 


Chloride  99 mmol/L ()   11/24/18  05:20    


 


Carbon Dioxide  25 mmol/L (21-32)   11/24/18  05:20    


 


Anion Gap  7 MMOL/L (8-16)  L  11/24/18  05:20    


 


BUN  15 mg/dL (7-18)   11/24/18  05:20    


 


Creatinine  0.7 mg/dL (0.55-1.3)   11/24/18  05:20    


 


Creat Clearance w eGFR  > 60  (>60)   11/24/18  05:20    


 


Random Glucose  89 mg/dL ()   11/24/18  05:20    


 


Calcium  7.0 mg/dL (8.5-10.1)  L  11/24/18  05:20    


 


Total Bilirubin  0.5 mg/dL (0.2-1)   11/24/18  05:20    


 


AST  38 U/L (15-37)  H  11/24/18  05:20    


 


ALT  21 U/L (13-61)   11/24/18  05:20    


 


Alkaline Phosphatase  117 U/L ()   11/24/18  05:20    


 


Total Protein  5.5 g/dl (6.4-8.2)  L  11/24/18  05:20    


 


Albumin  2.8 g/dl (3.4-5.0)  L  11/24/18  05:20    








 Current Medications











Generic Name Dose Route Start Last Admin





  Trade Name Freq  PRN Reason Stop Dose Admin


 


Enoxaparin Sodium  40 mg  11/24/18 10:00  





  Lovenox -  SQ   





  DAILY MARILEE   





     





     





     





     


 


Hydrocortisone  10 mg  11/24/18 10:00  





  Cortef -  PO   





  DAILY MARILEE   





     





     





     





     


 


Sodium Chloride  1,000 mls @ 100 mls/hr  11/24/18 05:30  11/24/18 06:16





  Normal Saline -  IV   100 mls/hr





  ASDIR MARILEE   Administration





     





     





     





     


 


Aztreonam 1 gm/ Dextrose  50 mls @ 100 mls/hr  11/24/18 10:00  





  IVPB   





  Q8H-IV MARILEE   





     





     





  Protocol   





     


 


Aztreonam 1 gm/ Dextrose  50 mls @ 100 mls/hr  11/24/18 10:00  





  IVPB  11/25/18 02:29  





  Q8H-IV MARILEE   





     





     





  Protocol   





     


 


Influenza Virus Vaccine Quadrival  60 mcg  11/24/18 12:00  





  Flulaval Quad [id number]  IM  11/24/18 12:01  





  .ONCE ONE   





     





     





     





     








 Home Medications











 Medication  Instructions  Recorded


 


Hydrocortisone 10 mg PO DAILY 11/24/18

















A/P: Patient is a 63yo female presented with having of fever 103. Myalgia. 

Patient was given IV antibiotic in ED.





#Acute Hyponatremia: will place her on IVF, repeat labs in am 





# Acute viral syndrome, will get ID to see her, Neuro on the case, No Meningeal 

sign. Tylenol for headache that she complained about also fever of 100.5 on 

rectal temp. Given a dose of IV Vancomycin and Aztreonam. Neuro for possible 

LP. 





# Acute sinusitis with headache, Neck is supple, no stiffness. IV vancomycin , 

azactam, discussed with  , No recent travelling,   





DVT prophylaxis - Lovenox 40 mg SQ q 24 hours.





 Advance directives - Full code





Physical Examination


Vital Signs: 


 Vital Signs











Temperature  99.5 F   11/24/18 07:56


 


Pulse Rate  89   11/24/18 07:56


 


Respiratory Rate  18   11/24/18 07:56


 


Blood Pressure  116/58 L  11/24/18 07:56


 


O2 Sat by Pulse Oximetry (%)  96   11/24/18 07:56











Labs: 


 CBC, BMP





 11/24/18 05:20 





 11/24/18 05:20

## 2018-11-24 NOTE — CON.NEP
Consult


Consult Specialty:: nephrology


Reason for Consultation:: hyponatremia





- History of Present Illness


Chief Complaint: sore throat


History of Present Illness: 





62 year old woman with history of hyponatremia who was diagnosed with adrenal 

insufficiency 3 months ago and has been on steroids, presents with fever and 

sore throa.  Her son states that she has been like this when she does not take 

her steroids which he says were reduced recently and she only takes 

irregularly.  She is comfortable lying flat





- History Source


History Provided By: Family Member


Limitations to Obtaining History: No Limitations





- Past Medical History


Endocrine: Yes: Merigold's Disease, Hypothyroidism





- Alcohol/Substance Use


Hx Alcohol Use: No





- Smoking History


Smoking history: Never smoked


Have you smoked in the past 12 months: No


Aproximately how many cigarettes per day: 0





Home Medications





- Allergies


Allergies/Adverse Reactions: 


 Allergies











Allergy/AdvReac Type Severity Reaction Status Date / Time


 


ampicillin Allergy  Swelling Verified 08/24/18 15:07


 


Penicillins Allergy  Swelling Verified 08/24/18 15:07














- Home Medications


Home Medications: 


Ambulatory Orders





Hydrocortisone 10 mg PO DAILY 11/24/18 











Review of Systems





- Review of Systems


Constitutional: reports: Fever, Weakness


Eyes: reports: No Symptoms


HENT: reports: Throat Pain


Neck: reports: No Symptoms


Cardiovascular: reports: No Symptoms


Respiratory: reports: No Symptoms


Gastrointestinal: reports: No Symptoms


Genitourinary: reports: No Symptoms


Breasts: reports: No Symptoms Reported


Musculoskeletal: reports: No Symptoms


Integumentary: reports: No Symptoms


Neurological: reports: No Symptoms


Endocrine: reports: No Symptoms


Hematology/Lymphatic: reports: No Symptoms


Psychiatric: reports: No Symptoms





Nephrology Consult





- Height


Height: 4 ft 6 in





- Weight


Weight: 100 lb 0.2 oz





- BMI


Body Mass Index (BMI): 24.0





- Lab Results


CBC,BMP: 


 CBC, BMP





 11/24/18 05:20 





 11/24/18 05:20 








Anion Gap: 


 Anion Gap











Anion Gap  7 MMOL/L (8-16)  L  11/24/18  05:20    














- Imaging


Chest X-ray: Report Reviewed (clear)





- Physical Examination


Vital Signs: 


 Vital Signs











Temperature  99.6 F   11/24/18 14:28


 


Pulse Rate  90   11/24/18 14:28


 


Respiratory Rate  18   11/24/18 14:28


 


Blood Pressure  123/58 L  11/24/18 14:28


 


O2 Sat by Pulse Oximetry (%)  96   11/24/18 07:56











Constitutional: Yes: No Distress, Calm


Eyes: Yes: Conjunctiva Clear, EOM Intact


HENT: Yes: Atraumatic, Normocephalic


Neck: Yes: Supple, Trachea Midline


Cardiovascular: Yes: Regular Rate and Rhythm


Respiratory: Yes: Regular, CTA Bilaterally


Gastrointestinal: Yes: Normal Bowel Sounds, Soft


Renal/: Yes: WNL


Musculoskeletal: Yes: WNL


Edema: No


Neurological: Yes: Alert, Oriented


Psychiatric: Yes: Alert, Oriented





Assessment/Plan





IMPRESSION


adrenal insufficiency based on previous cortisol level of 1


hyponatremia has improved with fluids


febrile illness probably worsened by absence of steroids


h/o hypothyroidism





PLAN


would give higher dose of hydrocortisone and then taper to a dose of about 10 

and 5 


hydrate 


monitor sodium and creat


recheck tsh and check t4- pt has h/o hypothyroid but is not on meds








MV

## 2018-11-24 NOTE — EKG
Test Reason : 

Blood Pressure : ***/*** mmHG

Vent. Rate : 096 BPM     Atrial Rate : 096 BPM

   P-R Int : 186 ms          QRS Dur : 100 ms

    QT Int : 358 ms       P-R-T Axes : 070 081 033 degrees

   QTc Int : 452 ms

 

NORMAL SINUS RHYTHM

T WAVE ABNORMALITY, CONSIDER ANTERIOR ISCHEMIA

ABNORMAL ECG

WHEN COMPARED WITH ECG OF 08-AUG-2018 02:47,

MORE PRONOUNCED T WAVE ABNORMALITY NOW PRESENT

Confirmed by ELIAS MOHR MD ([id number]) on 11/24/2018 11:56:38 AM

 

Referred By:             Confirmed By:ELIAS MOHR MD

## 2018-11-24 NOTE — CON.ID
Consult





- History of Present Illness


History of Present Illness: 





Asked to evaluate this 62 y.o.  female with PMH of CVA, hypothyroidism, 

and hyponatremia with possible adrenal insufficiency presenting with c/o sore 

throat that began 3 days ago with fever, headache, and somnolence. She has been 

easily arousable and without confusion. Pt's son is at bedside who served as an 

. He states that she was hospitalized in May 2018 with similar 

symptoms and underwent an LP which did not reveal any bacterial source. Denies 

any neck stiffness, photophobia, nausea/vomiting, shortness of breath/cough, 

chest pain at this time. At this time also denies dysuria/urinary frequency or 

urgency. As per son there has been no recent sick contacts or recent travel. In 

the ER she was noted to have a fever 103F, mild somnolence, and hyponatremia. 





- History Source


History Provided By: Patient, Family Member, Medical Record


Limitations to Obtaining History: No Limitations





- Past Medical History


CNS: Yes: CVA


Endocrine: Yes: Hypothyroidism, Other (adrenal insufficiency)





- Alcohol/Substance Use


Hx Alcohol Use: No





- Smoking History


Smoking history: Never smoked


Have you smoked in the past 12 months: No


Aproximately how many cigarettes per day: 0





- Social History


History of Recent Travel: No





Home Medications





- Allergies


Allergies/Adverse Reactions: 


 Allergies











Allergy/AdvReac Type Severity Reaction Status Date / Time


 


ampicillin Allergy  Swelling Verified 08/24/18 15:07


 


Penicillins Allergy  Swelling Verified 08/24/18 15:07














- Home Medications


Home Medications: 


Ambulatory Orders





Hydrocortisone 10 mg PO DAILY 11/24/18 











Review of Systems





- Review of Systems


Constitutional: reports: Chills, Fever, Lethargy


Eyes: reports: No Symptoms


HENT: reports: Throat Pain, Other (frontal headache)


Neck: reports: No Symptoms


Cardiovascular: reports: No Symptoms


Respiratory: reports: No Symptoms


Gastrointestinal: reports: No Symptoms


Genitourinary: reports: No Symptoms


Musculoskeletal: reports: No Symptoms


Integumentary: reports: No Symptoms


Neurological: reports: Other (somnolence)


Endocrine: reports: No Symptoms


Hematology/Lymphatic: reports: No Symptoms


Psychiatric: reports: No Symptoms





Physical Exam


Vital Signs: 


 Vital Signs











Temperature  99.6 F   11/24/18 14:28


 


Pulse Rate  90   11/24/18 14:28


 


Respiratory Rate  18   11/24/18 14:28


 


Blood Pressure  123/58 L  11/24/18 14:28


 


O2 Sat by Pulse Oximetry (%)  96   11/24/18 07:56











Constitutional: Yes: No Distress, Calm, Other (weak)


Eyes: Yes: Conjunctiva Clear, EOM Intact


HENT: Yes: Atraumatic, Normocephalic, Pharyngeal Erythema (minimal, no exudates)


Neck: Yes: Supple, Trachea Midline


Cardiovascular: Yes: Regular Rate and Rhythm


Respiratory: Yes: CTA Bilaterally


Gastrointestinal: Yes: Normal Bowel Sounds, Soft


Renal/: Yes: WNL


Musculoskeletal: Yes: WNL


Labs: 


 CBC, BMP





 11/24/18 05:20 





 11/24/18 05:20 





 Laboratory Tests











  11/23/18 11/23/18 11/23/18





  21:20 21:20 21:20


 


WBC  7.7  


 


RBC  4.07  


 


Hgb  12.6  


 


Hct  37.2  


 


MCV  91.4  


 


MCH  31.0  


 


MCHC  33.9  


 


RDW  12.8  D  


 


Plt Count  194  D  


 


MPV  8.0  D  


 


Absolute Neuts (auto)  5.2  


 


Neutrophils %  67.7  


 


Lymphocytes %  23.3  D  


 


Monocytes %  7.1  


 


Eosinophils %  1.3  D  


 


Basophils %  0.6  


 


Nucleated RBC %  0  


 


PT with INR   


 


INR   


 


PTT (Actin FS)   


 


VBG pH   


 


POC VBG pCO2   


 


POC VBG pO2   


 


Mixed VBG HCO3   


 


Sodium    129 L


 


Potassium    3.5


 


Chloride    90 L


 


Carbon Dioxide    27


 


Anion Gap    12


 


BUN    16


 


Creatinine    1.0


 


Creat Clearance w eGFR    55.82


 


Random Glucose    96


 


Lactic Acid   


 


Calcium    8.3 L


 


Total Bilirubin    0.5


 


AST    50 H


 


ALT    26


 


Alkaline Phosphatase    175 H


 


Total Protein    8.0


 


Albumin    4.1


 


TSH    1.30


 


Urine Color   Yellow 


 


Urine Appearance   Clear 


 


Urine pH   5.0  D 


 


Ur Specific Gravity   1.013 


 


Urine Protein   2+ H 


 


Urine Glucose (UA)   Negative 


 


Urine Ketones   Negative 


 


Urine Blood   3+ H 


 


Urine Nitrite   Negative 


 


Urine Bilirubin   Negative 


 


Urine Urobilinogen   Negative 


 


Ur Leukocyte Esterase   Negative 


 


Urine WBC (Auto)   3 


 


Urine RBC (Auto)   86 


 


Urine Bacteria   Few 


 


Urine Osmolality   


 


Influenza A (Rapid)   


 


Influenza B (Rapid)   


 


Group A Strep Rapid   














  11/23/18 11/23/18 11/23/18





  21:20 21:20 21:20


 


WBC   


 


RBC   


 


Hgb   


 


Hct   


 


MCV   


 


MCH   


 


MCHC   


 


RDW   


 


Plt Count   


 


MPV   


 


Absolute Neuts (auto)   


 


Neutrophils %   


 


Lymphocytes %   


 


Monocytes %   


 


Eosinophils %   


 


Basophils %   


 


Nucleated RBC %   


 


PT with INR  13.70 H  


 


INR  1.16 H  


 


PTT (Actin FS)  43.0 H  


 


VBG pH   


 


POC VBG pCO2   


 


POC VBG pO2   


 


Mixed VBG HCO3   


 


Sodium   


 


Potassium   


 


Chloride   


 


Carbon Dioxide   


 


Anion Gap   


 


BUN   


 


Creatinine   


 


Creat Clearance w eGFR   


 


Random Glucose   


 


Lactic Acid   2.1 H 


 


Calcium   


 


Total Bilirubin   


 


AST   


 


ALT   


 


Alkaline Phosphatase   


 


Total Protein   


 


Albumin   


 


TSH   


 


Urine Color   


 


Urine Appearance   


 


Urine pH   


 


Ur Specific Gravity   


 


Urine Protein   


 


Urine Glucose (UA)   


 


Urine Ketones   


 


Urine Blood   


 


Urine Nitrite   


 


Urine Bilirubin   


 


Urine Urobilinogen   


 


Ur Leukocyte Esterase   


 


Urine WBC (Auto)   


 


Urine RBC (Auto)   


 


Urine Bacteria   


 


Urine Osmolality    483


 


Influenza A (Rapid)   


 


Influenza B (Rapid)   


 


Group A Strep Rapid   














  11/23/18 11/23/18 11/23/18





  21:40 23:01 23:40


 


WBC   


 


RBC   


 


Hgb   


 


Hct   


 


MCV   


 


MCH   


 


MCHC   


 


RDW   


 


Plt Count   


 


MPV   


 


Absolute Neuts (auto)   


 


Neutrophils %   


 


Lymphocytes %   


 


Monocytes %   


 


Eosinophils %   


 


Basophils %   


 


Nucleated RBC %   


 


PT with INR   


 


INR   


 


PTT (Actin FS)   


 


VBG pH  7.43 H  


 


POC VBG pCO2  36.8 L  


 


POC VBG pO2  50.6 H D  


 


Mixed VBG HCO3  23.9  


 


Sodium   


 


Potassium   


 


Chloride   


 


Carbon Dioxide   


 


Anion Gap   


 


BUN   


 


Creatinine   


 


Creat Clearance w eGFR   


 


Random Glucose   


 


Lactic Acid   


 


Calcium   


 


Total Bilirubin   


 


AST   


 


ALT   


 


Alkaline Phosphatase   


 


Total Protein   


 


Albumin   


 


TSH   


 


Urine Color   


 


Urine Appearance   


 


Urine pH   


 


Ur Specific Gravity   


 


Urine Protein   


 


Urine Glucose (UA)   


 


Urine Ketones   


 


Urine Blood   


 


Urine Nitrite   


 


Urine Bilirubin   


 


Urine Urobilinogen   


 


Ur Leukocyte Esterase   


 


Urine WBC (Auto)   


 


Urine RBC (Auto)   


 


Urine Bacteria   


 


Urine Osmolality   


 


Influenza A (Rapid)   Negative 


 


Influenza B (Rapid)   Negative 


 


Group A Strep Rapid    Negative














  11/24/18 11/24/18 11/24/18





  05:20 05:20 06:16


 


WBC  5.6  


 


RBC  3.33 L  


 


Hgb  10.4 L  


 


Hct  30.5 L D  


 


MCV  91.7  


 


MCH  31.3  


 


MCHC  34.1  


 


RDW  12.9  


 


Plt Count  140  D  


 


MPV  7.2 L  


 


Absolute Neuts (auto)   


 


Neutrophils %   


 


Lymphocytes %   


 


Monocytes %   


 


Eosinophils %   


 


Basophils %   


 


Nucleated RBC %   


 


PT with INR   


 


INR   


 


PTT (Actin FS)   


 


VBG pH   


 


POC VBG pCO2   


 


POC VBG pO2   


 


Mixed VBG HCO3   


 


Sodium   130 L 


 


Potassium   3.3 L 


 


Chloride   99 


 


Carbon Dioxide   25 


 


Anion Gap   7 L 


 


BUN   15 


 


Creatinine   0.7 


 


Creat Clearance w eGFR   > 60 


 


Random Glucose   89 


 


Lactic Acid    0.8


 


Calcium   7.0 L 


 


Total Bilirubin   0.5 


 


AST   38 H 


 


ALT   21 


 


Alkaline Phosphatase   117 


 


Total Protein   5.5 L 


 


Albumin   2.8 L 


 


TSH   


 


Urine Color   


 


Urine Appearance   


 


Urine pH   


 


Ur Specific Gravity   


 


Urine Protein   


 


Urine Glucose (UA)   


 


Urine Ketones   


 


Urine Blood   


 


Urine Nitrite   


 


Urine Bilirubin   


 


Urine Urobilinogen   


 


Ur Leukocyte Esterase   


 


Urine WBC (Auto)   


 


Urine RBC (Auto)   


 


Urine Bacteria   


 


Urine Osmolality   


 


Influenza A (Rapid)   


 


Influenza B (Rapid)   


 


Group A Strep Rapid   














Imaging





- Results


Chest X-ray: Report Reviewed (clear)


Cat Scan: Report Reviewed (CT: ethmoid acute vs. chronic sinusitis)





Problem List





- Problems


(1) Altered mental status


Code(s): R41.82 - ALTERED MENTAL STATUS, UNSPECIFIED   


Qualifiers: 


   Altered mental status type: unspecified   Qualified Code(s): R41.82 - 

Altered mental status, unspecified   





(2) Headache


Code(s): R51 - HEADACHE   





(3) Hyponatremia


Code(s): E87.1 - HYPO-OSMOLALITY AND HYPONATREMIA   





Assessment/Plan





62 y.o. female with PMH of CVA, hyponatremia, possible adrenal insufficiency, 

hypothyroidism presenting with sore throat, headache, weakness, and fever x 3 

days. Previously admitted with similar symptoms 5/2018 with dx of possible 

aseptic meningitis. Rapid Strep and Influenza testing negative





Fever - Likely Viral etiology


Hyponatremia


AMS 


Adrenal insufficiency


Hypothyroidism





-- pt refusing LP, no specific meningeal findings noted 


-- suggest hold antibiotics and monitor closely


-- Endocrinology evaluation


-- monitor temp trend for now


will follow

## 2018-11-25 VITALS — HEART RATE: 77 BPM | TEMPERATURE: 97.8 F | DIASTOLIC BLOOD PRESSURE: 68 MMHG | SYSTOLIC BLOOD PRESSURE: 135 MMHG

## 2018-11-25 LAB
ALBUMIN SERPL-MCNC: 3.2 G/DL (ref 3.4–5)
ALP SERPL-CCNC: 131 U/L (ref 45–117)
ALT SERPL-CCNC: 21 U/L (ref 13–61)
ANION GAP SERPL CALC-SCNC: 5 MMOL/L (ref 8–16)
AST SERPL-CCNC: 40 U/L (ref 15–37)
BASOPHILS # BLD: 0.6 % (ref 0–2)
BILIRUB SERPL-MCNC: 0.3 MG/DL (ref 0.2–1)
BUN SERPL-MCNC: 5 MG/DL (ref 7–18)
CALCIUM SERPL-MCNC: 8.2 MG/DL (ref 8.5–10.1)
CHLORIDE SERPL-SCNC: 110 MMOL/L (ref 98–107)
CO2 SERPL-SCNC: 27 MMOL/L (ref 21–32)
CREAT SERPL-MCNC: 0.6 MG/DL (ref 0.55–1.3)
DEPRECATED RDW RBC AUTO: 12.9 % (ref 11.6–15.6)
EOSINOPHIL # BLD: 2.6 % (ref 0–4.5)
GLUCOSE SERPL-MCNC: 86 MG/DL (ref 74–106)
HCT VFR BLD CALC: 32.4 % (ref 32.4–45.2)
HGB BLD-MCNC: 10.7 GM/DL (ref 10.7–15.3)
LYMPHOCYTES # BLD: 24 % (ref 8–40)
MAGNESIUM SERPL-MCNC: 2.1 MG/DL (ref 1.8–2.4)
MCH RBC QN AUTO: 30.7 PG (ref 25.7–33.7)
MCHC RBC AUTO-ENTMCNC: 33.2 G/DL (ref 32–36)
MCV RBC: 92.7 FL (ref 80–96)
MONOCYTES # BLD AUTO: 7.5 % (ref 3.8–10.2)
NEUTROPHILS # BLD: 65.3 % (ref 42.8–82.8)
OSMOLALITY SERPL: 290 MOSM/KG (ref 278–305)
PHOSPHATE SERPL-MCNC: 2.4 MG/DL (ref 2.5–4.9)
PLATELET # BLD AUTO: 164 K/MM3 (ref 134–434)
PMV BLD: 7.8 FL (ref 7.5–11.1)
POTASSIUM SERPLBLD-SCNC: 3.6 MMOL/L (ref 3.5–5.1)
PROT SERPL-MCNC: 6.3 G/DL (ref 6.4–8.2)
RBC # BLD AUTO: 3.49 M/MM3 (ref 3.6–5.2)
SODIUM SERPL-SCNC: 142 MMOL/L (ref 136–145)
WBC # BLD AUTO: 2.9 K/MM3 (ref 4–10)

## 2018-11-25 RX ADMIN — ENOXAPARIN SODIUM SCH MG: 40 INJECTION SUBCUTANEOUS at 10:20

## 2018-11-25 RX ADMIN — SODIUM CHLORIDE SCH MLS/HR: 9 INJECTION, SOLUTION INTRAVENOUS at 05:00

## 2018-11-25 RX ADMIN — INFLUENZA VIRUS VACCINE ONE MCG: 15; 15; 15; 15 SUSPENSION INTRAMUSCULAR at 07:19

## 2018-11-25 RX ADMIN — INFLUENZA VIRUS VACCINE ONE: 15; 15; 15; 15 SUSPENSION INTRAMUSCULAR at 07:26

## 2018-11-25 RX ADMIN — HYDROCORTISONE SCH MG: 10 TABLET ORAL at 10:20

## 2018-11-25 NOTE — DS
Physical Exam: 


SUBJECTIVE: Patient seen and examined








OBJECTIVE:





 Vital Signs











 Period  Temp  Pulse  Resp  BP Sys/Adkins  Pulse Ox


 


 Last 24 Hr  97.2 F-98.6 F  70-87  19-20  111-143/58-73  96-96








PHYSICAL EXAM





GENERAL: The patient is awake, alert, and fully oriented, in no acute distress.


HEAD: Normal with no signs of trauma.


LUNGS: Breath sounds equal, clear to auscultation bilaterally, no wheezes, no 

crackles, no accessory muscle use. 


HEART: Regular rate and rhythm, S1, S2 without murmur, rub or gallop.


ABDOMEN: Soft, nontender, nondistended, normoactive bowel sounds, no guarding, 

no rebound, no hepatosplenomegaly, no masses.


EXTREMITIES: 2+ pulses, warm, well-perfused, no edema. 


NEUROLOGICAL: Cranial nerves II through XII grossly intact. Normal speech, gait 

not observed.


PSYCH: Normal mood, normal affect.


SKIN: Warm, dry, normal turgor, no rashes or lesions noted.





LABS


 Laboratory Results - last 24 hr











  11/24/18 11/24/18 11/25/18





  05:20 19:00 07:15


 


WBC   


 


RBC   


 


Hgb   


 


Hct   


 


MCV   


 


MCH   


 


MCHC   


 


RDW   


 


Plt Count   


 


MPV   


 


Absolute Neuts (auto)   


 


Neutrophils %   


 


Lymphocytes %   


 


Monocytes %   


 


Eosinophils %   


 


Basophils %   


 


Nucleated RBC %   


 


Sodium  130 L   142


 


Potassium  3.3 L   3.6


 


Chloride  99   110 H


 


Carbon Dioxide  25   27


 


Anion Gap  7 L   5 L


 


BUN  15   5 L


 


Creatinine  0.7   0.6


 


Creat Clearance w eGFR  > 60   > 60


 


Random Glucose  89   86


 


Serum Osmolality    290


 


Calcium  7.0 L   8.2 L


 


Phosphorus    2.4 L


 


Magnesium    2.1


 


Total Bilirubin  0.5   0.3


 


AST  38 H   40 H


 


ALT  21   21


 


Alkaline Phosphatase  117   131 H


 


Total Protein  5.5 L   6.3 L


 


Albumin  2.8 L   3.2 L


 


TSH    1.84


 


Ur Random Sodium   59 














  11/25/18





  07:15


 


WBC  2.9 L


 


RBC  3.49 L


 


Hgb  10.7


 


Hct  32.4


 


MCV  92.7


 


MCH  30.7


 


MCHC  33.2


 


RDW  12.9


 


Plt Count  164


 


MPV  7.8


 


Absolute Neuts (auto)  1.9


 


Neutrophils %  65.3


 


Lymphocytes %  24.0


 


Monocytes %  7.5


 


Eosinophils %  2.6  D


 


Basophils %  0.6


 


Nucleated RBC %  0


 


Sodium 


 


Potassium 


 


Chloride 


 


Carbon Dioxide 


 


Anion Gap 


 


BUN 


 


Creatinine 


 


Creat Clearance w eGFR 


 


Random Glucose 


 


Serum Osmolality 


 


Calcium 


 


Phosphorus 


 


Magnesium 


 


Total Bilirubin 


 


AST 


 


ALT 


 


Alkaline Phosphatase 


 


Total Protein 


 


Albumin 


 


TSH 


 


Ur Random Sodium 











HOSPITAL COURSE:





Date of Admission:11/24/18





Date of Discharge: 11/25/18


Ms. Lock is a 62 year old female with history of hyponatremia, nephrolithiasis

, CVA and hypothyroidism, adrenal insufficiency, who presented with a fever, 

altered mental status, and sore throat, no leukocytosis. Patient worked up for 

infectious and viral etiology. Head CT negative. CXR and urine were negative. 

This was most likely due to an acute viral process. She was found to be 

hyponatremic. IVF corrected sodium appropriately. Mental status had improved 

and has been afebrile since admission.  AMS was most likely due to metabolic 

encephalopathy. Infectious disease and neurology have also evaluated patient. 

Nephrology also evaluated patient who recommenced increasing hydrocortisone due 

to history of adrenal insufficiency. We have increased to 10mg hydrocortisone 

bid, for 5 days. Plan to follow up with primary and specialists.  





Minutes to complete discharge: 40





Discharge Summary


Reason For Visit: ALTERED MENTAL STATUS


Current Active Problems





Altered mental status (Acute)








Condition: Improved





- Instructions


Diet, Activity, Other Instructions: 


Ms. Lock, your fever has resolved and your electrolytes have stabilized. We 

do not recommence antibiotics at this time, this was most likely due to and 

acute viral process. Your headache, and altered mental status was most likely 

due to your low sodium. We would like you to follow up with your primary and 

your endocrinologist as well, due to your history of adrenal insufficiency. We 

have increase your hydrocortisone dose to 10mg twice a daily for the next five 

days. You can follow up with your endocrinologist and nephrologist to discuss 

decreasing dose at that time.  





If you experience any worsening of symptoms including intractable fevers, chills

, nausea, vomiting, please return to the emergency room. 





***Por la proxima maynor adkins; peggy stevens hydrocoritsona dos veces por mechelle. Despues 

habla con stevens doctor primario y endocrinologico.***


Disposition: HOME





- Home Medications


Comprehensive Discharge Medication List: 


Ambulatory Orders





Hydrocortisone 10 mg PO DAILY 11/24/18 








This patient is new to me today: Yes


Date on this admission: 11/25/18


Emergency Visit: Yes


ED Registration Date: 11/24/18


Care time: The patient presented to the Emergency Department on the above date 

and was hospitalized for further evaluation of their emergent condition.


Critical Care patient: No





- Discharge Referral


Referred to [facility] No

## 2018-11-25 NOTE — PN
Progress Note, Physician


History of Present Illness: 





stable


no complaints


no fever


feels well


no headache





- Current Medication List


Current Medications: 


Active Medications





Benzocaine/Menthol (Cepacol Lozenge -)  1 each MM PRN PRN


   PRN Reason: SORE THROAT


   Last Admin: 11/24/18 17:40 Dose:  1 each


Enoxaparin Sodium (Lovenox -)  40 mg SQ DAILY [facility]


   Last Admin: 11/25/18 10:20 Dose:  40 mg


Hydrocortisone (Cortef -)  10 mg PO BID [facility]


   Last Admin: 11/25/18 10:20 Dose:  10 mg











- Objective


Vital Signs: 


 Vital Signs











Temperature  98.6 F   11/25/18 10:00


 


Pulse Rate  87   11/25/18 10:00


 


Respiratory Rate  20   11/25/18 10:00


 


Blood Pressure  134/73   11/25/18 10:00


 


O2 Sat by Pulse Oximetry (%)  96   11/25/18 09:00











Constitutional: Yes: No Distress, Calm


Cardiovascular: Yes: Regular Rate and Rhythm


Respiratory: Yes: Regular, CTA Bilaterally


Gastrointestinal: Yes: Normal Bowel Sounds, Soft


Musculoskeletal: Yes: WNL


Extremities: Yes: WNL


Neurological: Yes: Alert, Oriented


Psychiatric: Yes: Alert, Oriented


Labs: 


 CBC, BMP





 11/25/18 07:15 





 11/25/18 07:15 





 INR, PTT











INR  1.16  (0.83-1.09)  H  11/23/18  21:20    














Assessment/Plan





Problem List





- Problems


(1) Altered mental status


Code(s): R41.82 - ALTERED MENTAL STATUS, UNSPECIFIED   


Qualifiers: 


   Altered mental status type: unspecified   Qualified Code(s): R41.82 - 

Altered mental status, unspecified   





(2) Headache


Code(s): R51 - HEADACHE   





(3) Hyponatremia


Code(s): E87.1 - HYPO-OSMOLALITY AND HYPONATREMIA   





Assessment/Plan





62 y.o. female with PMH of CVA, hyponatremia, possible adrenal insufficiency, 

hypothyroidism presenting with sore throat, headache, weakness, and fever x 3 

days. Previously admitted with similar symptoms 5/2018 with dx of possible 

aseptic meningitis. Rapid Strep and Influenza testing negative





continue to monitor


no abx


rest as per the team


patient stable

## 2018-11-25 NOTE — PN
Physical Exam: 


SUBJECTIVE: Patient seen and examined








OBJECTIVE:





 Vital Signs











 Period  Temp  Pulse  Resp  BP Sys/Ariza  Pulse Ox


 


 Last 24 Hr  97.2 F-99.6 F  70-90  18-20  111-143/58-71  96











GENERAL: The patient is awake, alert, and fully oriented, in no acute distress.


HEAD: Normal with no signs of trauma.


EYES: PERRL, extraocular movements intact, sclera anicteric, conjunctiva clear. 

No ptosis. 


ENT: Ears normal, nares patent, oropharynx clear without exudates, moist mucous 


membranes.


NECK: Trachea midline, full range of motion, supple. 


LUNGS: Breath sounds equal, clear to auscultation bilaterally, no wheezes, no 

crackles, no 


accessory muscle use. 


HEART: Regular rate and rhythm, S1, S2 without murmur, rub or gallop.


ABDOMEN: Soft, nontender, nondistended, normoactive bowel sounds, no guarding, 

no 


rebound, no hepatosplenomegaly, no masses.


EXTREMITIES: 2+ pulses, warm, well-perfused, no edema. 


NEUROLOGICAL: Cranial nerves II through XII grossly intact. Normal speech, gait 

not 


observed.


PSYCH: Normal mood, normal affect.


SKIN: Warm, dry, normal turgor, no rashes or lesions noted














 Laboratory Results - last 24 hr











  11/24/18 11/24/18 11/25/18





  05:20 19:00 07:15


 


WBC   


 


RBC   


 


Hgb   


 


Hct   


 


MCV   


 


MCH   


 


MCHC   


 


RDW   


 


Plt Count   


 


MPV   


 


Absolute Neuts (auto)   


 


Neutrophils %   


 


Lymphocytes %   


 


Monocytes %   


 


Eosinophils %   


 


Basophils %   


 


Nucleated RBC %   


 


Sodium  130 L   142


 


Potassium  3.3 L   3.6


 


Chloride  99   110 H


 


Carbon Dioxide  25   27


 


Anion Gap  7 L   5 L


 


BUN  15   5 L


 


Creatinine  0.7   0.6


 


Creat Clearance w eGFR  > 60   > 60


 


Random Glucose  89   86


 


Serum Osmolality    290


 


Calcium  7.0 L   8.2 L


 


Phosphorus    2.4 L


 


Magnesium    2.1


 


Total Bilirubin  0.5   0.3


 


AST  38 H   40 H


 


ALT  21   21


 


Alkaline Phosphatase  117   131 H


 


Total Protein  5.5 L   6.3 L


 


Albumin  2.8 L   3.2 L


 


TSH    1.84


 


Ur Random Sodium   59 














  11/25/18





  07:15


 


WBC  2.9 L


 


RBC  3.49 L


 


Hgb  10.7


 


Hct  32.4


 


MCV  92.7


 


MCH  30.7


 


MCHC  33.2


 


RDW  12.9


 


Plt Count  164


 


MPV  7.8


 


Absolute Neuts (auto)  1.9


 


Neutrophils %  65.3


 


Lymphocytes %  24.0


 


Monocytes %  7.5


 


Eosinophils %  2.6  D


 


Basophils %  0.6


 


Nucleated RBC %  0


 


Sodium 


 


Potassium 


 


Chloride 


 


Carbon Dioxide 


 


Anion Gap 


 


BUN 


 


Creatinine 


 


Creat Clearance w eGFR 


 


Random Glucose 


 


Serum Osmolality 


 


Calcium 


 


Phosphorus 


 


Magnesium 


 


Total Bilirubin 


 


AST 


 


ALT 


 


Alkaline Phosphatase 


 


Total Protein 


 


Albumin 


 


TSH 


 


Ur Random Sodium 








Active Medications











Generic Name Dose Route Start Last Admin





  Trade Name Freq  PRN Reason Stop Dose Admin


 


Benzocaine/Menthol  1 each  11/24/18 14:36  11/24/18 17:40





  Cepacol Lozenge -  MM   1 each





  PRN PRN   Administration





  SORE THROAT   





     





     





     


 


Enoxaparin Sodium  40 mg  11/24/18 10:00  11/25/18 10:20





  Lovenox -  SQ   40 mg





  DAILY MARILEE   Administration





     





     





     





     


 


Hydrocortisone  10 mg  11/24/18 22:00  11/25/18 10:20





  Cortef -  PO   10 mg





  BID MARILEE   Administration





     





     





     





     


 


Sodium Chloride  1,000 mls @ 100 mls/hr  11/24/18 05:30  11/25/18 05:00





  Normal Saline -  IV   100 mls/hr





  ASDIR MARILEE   Administration





     





     





     





     











ASSESSMENT/PLAN:

## 2018-11-25 NOTE — CON.NEURO
Consult


Consult Specialty:: neurology


Reason for Consultation:: AMS





- History of Present Illness


History of Present Illness: 





Asked to evaluate this 62 y.o.  female with PMH of CVA, hypothyroidism, 

and hyponatremia with possible adrenal insufficiency presenting with c/o sore 

throat that began 3 days ago with fever, headache, and somnolence. She has been 

easily arousable and without confusion. Pt's son is at bedside who served as an 

. He states that she was hospitalized in May 2018 with similar 

symptoms and underwent an LP which did not reveal any bacterial source. Denies 

any neck stiffness, photophobia, nausea/vomiting, shortness of breath/cough, 

chest pain at this time. At this time also denies dysuria/urinary frequency or 

urgency. As per son there has been no recent sick contacts or recent travel. In 

the ER she was noted to have a fever 103F, mild somnolence, and hyponatremia.


I saw and examined the pt at the bedside; she denies any headache , dizziness , 

numbness or weakness. Her MS is improved. 





- Past Medical History


CNS: Yes: CVA


Endocrine: Yes: Houston's Disease, Hypothyroidism





- Alcohol/Substance Use


Hx Alcohol Use: No





- Smoking History


Smoking history: Never smoked


Have you smoked in the past 12 months: No


Aproximately how many cigarettes per day: 0





- Social History


History of Recent Travel: No





Home Medications





- Allergies


Allergies/Adverse Reactions: 


 Allergies











Allergy/AdvReac Type Severity Reaction Status Date / Time


 


ampicillin Allergy  Swelling Verified 08/24/18 15:07


 


Penicillins Allergy  Swelling Verified 08/24/18 15:07














- Home Medications


Home Medications: 


Ambulatory Orders





Hydrocortisone 10 mg PO DAILY 11/24/18 











Review of Systems





- Review of Systems


Constitutional: reports: No Symptoms


Eyes: reports: No Symptoms


HENT: reports: No Symptoms


Neck: reports: No Symptoms


Cardiovascular: reports: No Symptoms


Respiratory: reports: No Symptoms


Genitourinary: reports: No Symptoms





Physical Exam-Neuro


Vital Signs: 


 Vital Signs











Temperature  98.2 F   11/25/18 06:25


 


Pulse Rate  79   11/25/18 06:25


 


Respiratory Rate  20   11/25/18 06:25


 


Blood Pressure  143/71   11/25/18 06:25


 


O2 Sat by Pulse Oximetry (%)  96   11/24/18 21:00











Constitutional: Yes: Well Nourished


Neck: Yes: Supple


Cardiovascular: Yes: Regular Rate and Rhythm


Respiratory: Yes: CTA Bilaterally


Musculoskeletal: Yes: WNL


Edema: No


Psychiatric: Yes: WNL


Labs: 


 CBC, BMP





 11/25/18 07:15 





 11/25/18 07:15 





 INR, PTT











INR  1.16  (0.83-1.09)  H  11/23/18  21:20    














- Neuro Exam


Level Of Consciousness: Yes: Oriented to Person, Oriented to Place, Oriented to 

Time


Eyes: Yes: PERRLA


Speech: WNL


Cranial Nerves II-XII Intact: Yes


Gag: Present


DTR's: 1+ Left Bicep, 1+ Right Bicep, 1+ Left Tricep, 1+ Right Tricep, 1+ Left 

Brachioradialis, 1+ Right Brachioradialis, 1+ Left Achilles, 1+ Right Achilles


Babinski: Absent


Response to light touch: Normal


Response to pain prick: Normal


Coordination: Normal: Finger to Nose, Heel to Shin


Motor Strength: 5/5: Left Arm, Right Arm, Left Leg, Right Leg


Gait: Normal





Imaging





- Results


Cat Scan: Report Reviewed (No acute finding.), Image Reviewed





Problem List





- Problems


(1) Altered mental status


Code(s): R41.82 - ALTERED MENTAL STATUS, UNSPECIFIED   


Qualifiers: 


   Altered mental status type: unspecified   Qualified Code(s): R41.82 - 

Altered mental status, unspecified   





(2) Candidiasis of breast


Code(s): B37.89 - OTHER SITES OF CANDIDIASIS   





(3) Headache


Code(s): R51 - HEADACHE   





(4) Hyponatremia


Code(s): E87.1 - HYPO-OSMOLALITY AND HYPONATREMIA   





Assessment/Plan








Asked to evaluate this 62 y.o.  female with PMH of stroke , 

hypothyroidism, and hyponatremia with possible adrenal insufficiency presenting 

with c/o sore throat that began 3 days ago with fever and AMS. Her sym resolved

; CTH -, exam nonfocal ;


AMS due to metabolic encephalopathy due to hyponatremia, exam unremarkable  -> 

has resolved; no sign or sym of meningitis .


Monitor Na


F/U endocri


F/U as OP


Health maintenance per primary team.


Thanks


Maurisio Ingram MD


[phone]

## 2018-11-25 NOTE — PN
Teaching Attending Note


Name of Resident: Mayte Santiago





ATTENDING PHYSICIAN STATEMENT





I saw and evaluated the patient.


I reviewed the resident's note and discussed the case with the resident.


I agree with the resident's findings and plan as documented.








SUBJECTIVE:


Patient is feeling better with no acute distress, Wants to go home, no new 

events, no fever overnight.





OBJECTIVE:


 Vital Signs











Temperature  97.8 F   11/25/18 14:40


 


Pulse Rate  77   11/25/18 14:40


 


Respiratory Rate  18   11/25/18 14:40


 


Blood Pressure  135/68   11/25/18 14:40


 


O2 Sat by Pulse Oximetry (%)  96   11/25/18 09:00








GENERAL: Awake, alert, and fully oriented, in no acute distress.


HEAD: Normal with no signs of trauma.


EYES: Pupils equal, round and reactive to light, extraocular movements intact


EARS, NOSE, THROAT:  oropharynx clear without exudates. Moist mucous membranes.


NECK: Normal range of motion, supple without lymphadenopathy, JVD, or masses.


LUNGS: Breath sounds equal, clear to auscultation bilaterally. No wheezes, and 

no crackles. No accessory muscle use.


HEART: RRR,  normal S1 and S2 without murmur, rub or gallop.


ABDOMEN: Soft, nontender, not distended, normoactive bowel sounds, no guarding, 

no rebound, no masses.  


EXTREMITIES: 2+ pulses, warmNo calf tenderness. No peripheral edema. 


NEUROLOGICAL:  Cranial nerves II-XII intact. Normal speech. no meningeal  sign. 


PSYCHIATRIC: Cooperative. Good eye contact. Appropriate mood and affect.


SKIN: Warm, dry, normal turgor, no rashes or lesions noted, normal capillary 

refill. 


 CBCD











WBC  2.9 K/mm3 (4.0-10.0)  L  11/25/18  07:15    


 


RBC  3.49 M/mm3 (3.60-5.2)  L  11/25/18  07:15    


 


Hgb  10.7 GM/dL (10.7-15.3)   11/25/18  07:15    


 


Hct  32.4 % (32.4-45.2)   11/25/18  07:15    


 


MCV  92.7 fl (80-96)   11/25/18  07:15    


 


MCHC  33.2 g/dl (32.0-36.0)   11/25/18  07:15    


 


RDW  12.9 % (11.6-15.6)   11/25/18  07:15    


 


Plt Count  164 K/MM3 (134-434)   11/25/18  07:15    


 


MPV  7.8 fl (7.5-11.1)   11/25/18  07:15    








 CMP











Sodium  142 mmol/L (136-145)   11/25/18  07:15    


 


Potassium  3.6 mmol/L (3.5-5.1)   11/25/18  07:15    


 


Chloride  110 mmol/L ()  H  11/25/18  07:15    


 


Carbon Dioxide  27 mmol/L (21-32)   11/25/18  07:15    


 


Anion Gap  5 MMOL/L (8-16)  L  11/25/18  07:15    


 


BUN  5 mg/dL (7-18)  L  11/25/18  07:15    


 


Creatinine  0.6 mg/dL (0.55-1.3)   11/25/18  07:15    


 


Creat Clearance w eGFR  > 60  (>60)   11/25/18  07:15    


 


Random Glucose  86 mg/dL ()   11/25/18  07:15    


 


Calcium  8.2 mg/dL (8.5-10.1)  L  11/25/18  07:15    


 


Total Bilirubin  0.3 mg/dL (0.2-1)   11/25/18  07:15    


 


AST  40 U/L (15-37)  H  11/25/18  07:15    


 


ALT  21 U/L (13-61)   11/25/18  07:15    


 


Alkaline Phosphatase  131 U/L ()  H  11/25/18  07:15    


 


Total Protein  6.3 g/dl (6.4-8.2)  L  11/25/18  07:15    


 


Albumin  3.2 g/dl (3.4-5.0)  L  11/25/18  07:15    








 Current Medications











Generic Name Dose Route Start Last Admin





  Trade Name Freq  PRN Reason Stop Dose Admin


 


Benzocaine/Menthol  1 each  11/24/18 14:36  11/24/18 17:40





  Cepacol Lozenge -  MM   1 each





  PRN PRN   Administration





  SORE THROAT   





     





     





     


 


Enoxaparin Sodium  40 mg  11/24/18 10:00  11/25/18 10:20





  Lovenox -  SQ   40 mg





  DAILY MARILEE   Administration





     





     





     





     


 


Hydrocortisone  10 mg  11/24/18 22:00  11/25/18 10:20





  Cortef -  PO   10 mg





  BID MARILEE   Administration





     





     





     





     








 Home Medications











 Medication  Instructions  Recorded


 


Hydrocortisone 10 mg PO DAILY 11/24/18


 


Hydrocortisone 10 mg PO BID 5 Days #10 tablet 11/25/18














ASSESSMENT AND PLAN:


A/P: Patient is a 63yo female presented with having of fever 103. Myalgia. 

Patient was given IV antibiotic in ED.





#Acute Hyponatremia: improved.





# Acute viral syndrome, improved. id is ok to discharge the patient. no further 

fever or chills. neuro consult appreciated. No Meningeal sign. no further 

antibiotic needed as per id.





# Acute sinusitis with headache, improved. no stiffness. no further antibiotic 

needed. patient has no recent travelling.   





discharge patient home.

## 2018-11-25 NOTE — PN
Progress Note (short form)





- Note


Progress Note: 





RENAL


Pt is awake and alert


says she feels better a=except she still has a  cold





 Last Vital Signs











Temp Pulse Resp BP Pulse Ox


 


 98.2 F   79   20   143/71   96 


 


 11/25/18 06:25  11/25/18 06:25  11/25/18 06:25  11/25/18 06:25  11/24/18 21:00








lungs clear


cvs s1s2 rr


abd soft


ext no edema


neuro a+ox3


 CBC, BMP





 11/25/18 07:15 





 11/25/18 07:15 





 Current Medications











Generic Name Dose Route Start Last Admin





  Trade Name Freq  PRN Reason Stop Dose Admin


 


Benzocaine/Menthol  1 each  11/24/18 14:36  11/24/18 17:40





  Cepacol Lozenge -  MM   1 each





  PRN PRN   Administration





  SORE THROAT   





     





     





     


 


Enoxaparin Sodium  40 mg  11/24/18 10:00  11/25/18 10:20





  Lovenox -  SQ   40 mg





  DAILY MARILEE   Administration





     





     





     





     


 


Hydrocortisone  10 mg  11/24/18 22:00  11/25/18 10:20





  Cortef -  PO   10 mg





  BID MARILEE   Administration





     





     





     





     


 


Sodium Chloride  1,000 mls @ 100 mls/hr  11/24/18 05:30  11/25/18 05:00





  Normal Saline -  IV   100 mls/hr





  ASDIR MARILEE   Administration





     





     





     





     








IMPRESSION


adrenal insufficiency based on previous cortisol level of 1


hyponatremia has improved with fluids and steroids


febrile illness probably worsened by absence of steroids


h/o hypothyroidism





PLAN


would give higher dose of hydrocortisone and then taper to a dose of about 10 

and 5 


dc ivf and observe


monitor sodium and creat








MV

## 2018-11-26 LAB — CMV IGM TITR SERPL: < 30 AU/ML (ref 0–29.9)

## 2019-06-28 ENCOUNTER — HOSPITAL ENCOUNTER (EMERGENCY)
Dept: HOSPITAL 74 - JER | Age: 64
Discharge: HOME | End: 2019-06-28
Payer: SELF-PAY

## 2019-06-28 VITALS — DIASTOLIC BLOOD PRESSURE: 53 MMHG | HEART RATE: 71 BPM | SYSTOLIC BLOOD PRESSURE: 117 MMHG

## 2019-06-28 VITALS — BODY MASS INDEX: 52.2 KG/M2

## 2019-06-28 DIAGNOSIS — E07.9: ICD-10-CM

## 2019-06-28 DIAGNOSIS — I10: ICD-10-CM

## 2019-06-28 DIAGNOSIS — R10.9: Primary | ICD-10-CM

## 2019-06-28 LAB
ALBUMIN SERPL-MCNC: 4.2 G/DL (ref 3.4–5)
ALP SERPL-CCNC: 100 U/L (ref 45–117)
ALT SERPL-CCNC: 18 U/L (ref 13–61)
ANION GAP SERPL CALC-SCNC: 4 MMOL/L (ref 8–16)
APPEARANCE UR: CLEAR
AST SERPL-CCNC: 23 U/L (ref 15–37)
BACTERIA # UR AUTO: 5.4 /HPF
BASOPHILS # BLD: 0.6 % (ref 0–2)
BILIRUB SERPL-MCNC: 0.7 MG/DL (ref 0.2–1)
BILIRUB UR STRIP.AUTO-MCNC: NEGATIVE MG/DL
BUN SERPL-MCNC: 12.4 MG/DL (ref 7–18)
CALCIUM SERPL-MCNC: 9.2 MG/DL (ref 8.5–10.1)
CASTS URNS QL MICRO: 0 /LPF (ref 0–8)
CHLORIDE SERPL-SCNC: 100 MMOL/L (ref 98–107)
CO2 SERPL-SCNC: 32 MMOL/L (ref 21–32)
COLOR UR: YELLOW
CREAT SERPL-MCNC: 0.9 MG/DL (ref 0.55–1.3)
DEPRECATED RDW RBC AUTO: 13.5 % (ref 11.6–15.6)
EOSINOPHIL # BLD: 4.9 % (ref 0–4.5)
EPITH CASTS URNS QL MICRO: 0.9 /HPF
GLUCOSE SERPL-MCNC: 96 MG/DL (ref 74–106)
HCT VFR BLD CALC: 39 % (ref 32.4–45.2)
HGB BLD-MCNC: 13.4 GM/DL (ref 10.7–15.3)
KETONES UR QL STRIP: NEGATIVE
LEUKOCYTE ESTERASE UR QL STRIP.AUTO: (no result)
LYMPHOCYTES # BLD: 47.7 % (ref 8–40)
MCH RBC QN AUTO: 30.8 PG (ref 25.7–33.7)
MCHC RBC AUTO-ENTMCNC: 34.5 G/DL (ref 32–36)
MCV RBC: 89.2 FL (ref 80–96)
MONOCYTES # BLD AUTO: 5.7 % (ref 3.8–10.2)
NEUTROPHILS # BLD: 41.1 % (ref 42.8–82.8)
NITRITE UR QL STRIP: NEGATIVE
PH UR: 7 [PH] (ref 5–8)
PLATELET # BLD AUTO: 212 K/MM3 (ref 134–434)
PMV BLD: 7.3 FL (ref 7.5–11.1)
POTASSIUM SERPLBLD-SCNC: 4.2 MMOL/L (ref 3.5–5.1)
PROT SERPL-MCNC: 7.8 G/DL (ref 6.4–8.2)
PROT UR QL STRIP: NEGATIVE
PROT UR QL STRIP: NEGATIVE
RBC # BLD AUTO: 4.37 M/MM3 (ref 3.6–5.2)
RBC # BLD AUTO: 72 /HPF (ref 0–4)
SODIUM SERPL-SCNC: 136 MMOL/L (ref 136–145)
SP GR UR: 1.02 (ref 1.01–1.03)
UROBILINOGEN UR STRIP-MCNC: 1 MG/DL (ref 0.2–1)
WBC # BLD AUTO: 4.6 K/MM3 (ref 4–10)
WBC # UR AUTO: 1 /HPF (ref 0–5)

## 2019-06-28 PROCEDURE — 3E0233Z INTRODUCTION OF ANTI-INFLAMMATORY INTO MUSCLE, PERCUTANEOUS APPROACH: ICD-10-PCS

## 2019-06-28 NOTE — PDOC
History of Present Illness





- General


Chief Complaint: Back Pain


Stated Complaint: BACK PAIN


Time Seen by Provider: 06/28/19 16:24


History Source: Patient


Exam Limitations: No Limitations





- History of Present Illness


Initial Comments: 





06/28/19 17:01


63-year-old female with history of sodium disorder currently on hydrocortisone 

for regulation presents to ED with complaints of left sided back pain radiating 

to her left upper quadrant since this morning. Patient denies nausea vomiting 

diarrhea, fever, chills shortness of breath or cough. Patient denies 

palpitations diaphoresis, dizziness, or chest pain.


Timing/Duration: 24 hours


Severity: mild


Associated Symptoms: reports: other (left chest / left back)





Past History





- Travel


Traveled outside of the country in the last 30 days: No


Close contact w/someone who was outside of country & ill: No





- Past Medical History


Allergies/Adverse Reactions: 


 Allergies











Allergy/AdvReac Type Severity Reaction Status Date / Time


 


ampicillin Allergy  Swelling Verified 06/28/19 16:23


 


Penicillins Allergy  Swelling Verified 06/28/19 16:23











Home Medications: 


Ambulatory Orders





Hydrocortisone 10 mg PO DAILY 11/24/18 


Hydrocortisone 10 mg PO BID 5 Days #10 tablet 11/25/18 








Anemia: No


Asthma: No


Cancer: No


Cardiac Disorders: No


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


COPD: No


HTN: Yes


Thyroid Disease: Yes (has not taken medication >7yrs)





- Surgical History


Abdominal Surgery: No


Appendectomy: No


Cholecystectomy: No


Gastric Stapling: No





- Suicide/Smoking/Psychosocial Hx


Smoking Status: No


Smoking History: Never smoked


Have you smoked in the past 12 months: No


Number of Cigarettes Smoked Daily: 0


Information on smoking cessation initiated: No


Hx Alcohol Use: No


Drug/Substance Use Hx: No


Substance Use Type: None


Hx Substance Use Treatment: No


Patient Lives Alone: No


Lives with/in: spouse/SO





**Review of Systems





- Review of Systems


Able to Perform ROS?: Yes


Constitutional: No: Symptoms Reported


HEENTM: No: Symptoms Reported


Respiratory: No: Symptoms reported


Cardiac (ROS): No: Symptoms Reported


ABD/GI: Yes: Abdominal cramping (LUQ)


: No: Symptoms Reported


Musculoskeletal: Yes: Back Pain


Integumentary: No: Symptoms Reported


Neurological: No: Symptoms reported





*Physical Exam





- Vital Signs


 Last Vital Signs











Temp Pulse Resp BP Pulse Ox


 


    71   16   117/53 L  95 


 


    06/28/19 16:23  06/28/19 16:23  06/28/19 16:23  06/28/19 16:23














- Physical Exam


General Appearance: Yes: Nourished, Appropriately Dressed.  No: Apparent 

Distress


HEENT: negative: Pale Conjunctivae


Neck: positive: Normal Thyroid


Respiratory/Chest: positive: Lungs Clear, Normal Breath Sounds.  negative: 

Chest Tender, Respiratory Distress, Accessory Muscle Use


Cardiovascular: positive: Regular Rhythm, Regular Rate.  negative: Murmur


Gastrointestinal/Abdominal: positive: Soft, Tenderness (luq).  negative: 

Distended, Guarding, Rebound


Musculoskeletal: positive: CVA Tenderness (L) (mild)


Extremity: positive: Normal Inspection


Integumentary: positive: Normal Color, Warm, Moist


Neurologic: positive: Motor Strength 5/5 (ambulatory)





**Heart Score/ECG Review





- ECG Intrepretation


Rhythm: Regular Rhythm (rate 57, qtc 471 ms, no st elevation or depression)





ED Treatment Course





- LABORATORY


CBC & Chemistry Diagram: 


 06/28/19 17:29





 06/28/19 17:29





- ADDITIONAL ORDERS


Additional order review: 


 











  06/28/19





  17:29


 


RBC  4.37


 


MCV  89.2


 


MCHC  34.5


 


RDW  13.5


 


MPV  7.3 L


 


Neutrophils %  41.1 L D


 


Lymphocytes %  47.7 H D


 


Monocytes %  5.7


 


Eosinophils %  4.9 H D


 


Basophils %  0.6














- RADIOLOGY


Radiology Studies Ordered: 














 Category Date Time Status


 


 CHEST X-RAY PORTABLE* [RAD] Stat Radiology  06/28/19 17:42 Ordered














Medical Decision Making





- Medical Decision Making





06/28/19 17:58


CC: Back soreness worsened with movement radiating to the left upper quadrant. 

Patient has no other complaints at this time


Exam. Patient mild left CVA tenderness and left upper quadrant pain. Lungs 

clear to auscultation vital signs stable


Plan labs, urine, chest x-ray and EKG ordered


06/28/19 18:36


 Laboratory Tests











  06/28/19 06/28/19





  17:29 17:29


 


WBC  4.6 


 


Hgb  13.4 


 


Hct  39.0  D 


 


MPV  7.3 L 


 


Neutrophils %  41.1 L D 


 


Lymphocytes %  47.7 H D 


 


Eosinophils %  4.9 H D 


 


Sodium   136


 


Potassium   4.2


 


Chloride   100


 


Carbon Dioxide   32


 


Anion Gap   4 L


 


BUN   12.4


 


Creatinine   0.9


 


Est GFR (CKD-EPI)AfAm   77.77


 


Est GFR (CKD-EPI)NonAf   67.10


 


Random Glucose   96


 


Calcium   9.2


 


Total Bilirubin   0.7


 


AST   23


 


ALT   18


 


Troponin I   < 0.02











06/28/19 18:37


Pt states feeling better. Will send home with tylenol. 


06/28/19 18:41


 Laboratory Tests











  11/23/18 06/28/19





  21:20 18:00


 


Urine Blood   2+ H


 


Urine Nitrite   Negative


 


Urine Bilirubin   Negative


 


Ur Leukocyte Esterase   Trace


 


Urine WBC (Auto)   1


 


Urine RBC (Auto)  86  72














*DC/Admit/Observation/Transfer


Diagnosis at time of Disposition: 


 Abdominal pain








- Discharge Dispostion


Disposition: HOME


Condition at time of disposition: Improved





- Referrals


Referrals: 


Glo Koch MD [Primary Care Provider] - 





- Patient Instructions


Printed Discharge Instructions:  DI for Abdominal Pain-Adult


Additional Instructions: 


Take Tylenol 650 mg every 6-8 hours for discomfort.


Drink plenty of fluids and if your symptoms worsen despite above 

recommendations please return to the ED or follow-up with your primary care 

physician. 





- Post Discharge Activity

## 2019-06-28 NOTE — PDOC
Rapid Medical Evaluation


Chief Complaint: Back Pain


Time Seen by Provider: 06/28/19 16:24


Medical Evaluation: 


 Allergies











Allergy/AdvReac Type Severity Reaction Status Date / Time


 


ampicillin Allergy  Swelling Verified 06/28/19 16:23


 


Penicillins Allergy  Swelling Verified 06/28/19 16:23











06/28/19 16:25


I have performed a brief in-person evaluation of this patient.





The patient presents with a chief complaint of: upper left back/upper left abd 

pain


 


Pertinent physical exam findings:stable and in NAD, non-focal





I have ordered the following:labs





The patient will proceed to the ED for further evaluation.

## 2019-07-01 NOTE — EKG
Test Reason : 

Blood Pressure : ***/*** mmHG

Vent. Rate : 057 BPM     Atrial Rate : 057 BPM

   P-R Int : 200 ms          QRS Dur : 092 ms

    QT Int : 484 ms       P-R-T Axes : 047 065 044 degrees

   QTc Int : 471 ms

 

SINUS BRADYCARDIA

INCOMPLETE RIGHT BUNDLE BRANCH BLOCK

T WAVE ABNORMALITY, CONSIDER ANTERIOR ISCHEMIA

PROLONGED QT

ABNORMAL ECG

WHEN COMPARED WITH ECG OF 23-NOV-2018 23:05,

VENT. RATE HAS DECREASED BY  39 BPM

INCOMPLETE RIGHT BUNDLE BRANCH BLOCK IS NOW PRESENT

Confirmed by MD Maggie, Larry ([id number]) on 7/1/2019 12:32:02 AM

 

Referred By:             Confirmed By:Larry Serrano MD